# Patient Record
Sex: FEMALE | Race: WHITE | Employment: FULL TIME
[De-identification: names, ages, dates, MRNs, and addresses within clinical notes are randomized per-mention and may not be internally consistent; named-entity substitution may affect disease eponyms.]

---

## 2024-08-24 ENCOUNTER — APPOINTMENT (OUTPATIENT)
Facility: HOSPITAL | Age: 49
End: 2024-08-24
Payer: COMMERCIAL

## 2024-08-24 ENCOUNTER — HOSPITAL ENCOUNTER (INPATIENT)
Facility: HOSPITAL | Age: 49
LOS: 6 days | Discharge: HOME | End: 2024-08-30
Admitting: HOSPITALIST
Payer: COMMERCIAL

## 2024-08-24 DIAGNOSIS — I63.412 CEREBROVASCULAR ACCIDENT (CVA) DUE TO EMBOLISM OF LEFT MIDDLE CEREBRAL ARTERY (HCC): ICD-10-CM

## 2024-08-24 DIAGNOSIS — I82.4Y9 ACUTE DEEP VEIN THROMBOSIS (DVT) OF PROXIMAL VEIN OF LOWER EXTREMITY, UNSPECIFIED LATERALITY (HCC): ICD-10-CM

## 2024-08-24 DIAGNOSIS — I24.9 ACUTE CORONARY SYNDROME (HCC): Primary | ICD-10-CM

## 2024-08-24 DIAGNOSIS — I20.0 UNSTABLE ANGINA PECTORIS (HCC): ICD-10-CM

## 2024-08-24 DIAGNOSIS — I63.9 CEREBROVASCULAR ACCIDENT (CVA), UNSPECIFIED MECHANISM (HCC): ICD-10-CM

## 2024-08-24 DIAGNOSIS — I63.89 CEREBROVASCULAR ACCIDENT (CVA) DUE TO OTHER MECHANISM (HCC): ICD-10-CM

## 2024-08-24 LAB
ALBUMIN SERPL-MCNC: 3.9 G/DL (ref 3.5–5)
ALBUMIN SERPL-MCNC: 3.9 G/DL (ref 3.5–5)
ALBUMIN/GLOB SERPL: 1 (ref 1.1–2.2)
ALBUMIN/GLOB SERPL: 1 (ref 1.1–2.2)
ALP SERPL-CCNC: 67 U/L (ref 45–117)
ALP SERPL-CCNC: 67 U/L (ref 45–117)
ALT SERPL-CCNC: 22 U/L (ref 12–78)
ALT SERPL-CCNC: 22 U/L (ref 12–78)
ANION GAP SERPL CALC-SCNC: 6 MMOL/L (ref 5–15)
ANION GAP SERPL CALC-SCNC: 6 MMOL/L (ref 5–15)
APTT PPP: 23.9 SEC (ref 22.1–31)
APTT PPP: 23.9 SEC (ref 22.1–31)
AST SERPL-CCNC: 44 U/L (ref 15–37)
AST SERPL-CCNC: 44 U/L (ref 15–37)
BASOPHILS # BLD: 0 K/UL (ref 0–0.1)
BASOPHILS # BLD: 0 K/UL (ref 0–0.1)
BASOPHILS # BLD: ABNORMAL K/UL
BASOPHILS # BLD: ABNORMAL K/UL
BASOPHILS NFR BLD: 0 % (ref 0–1)
BASOPHILS NFR BLD: 0 % (ref 0–1)
BASOPHILS NFR BLD: ABNORMAL %
BASOPHILS NFR BLD: ABNORMAL %
BILIRUB SERPL-MCNC: 0.5 MG/DL (ref 0.2–1)
BILIRUB SERPL-MCNC: 0.5 MG/DL (ref 0.2–1)
BUN SERPL-MCNC: 14 MG/DL (ref 6–20)
BUN SERPL-MCNC: 14 MG/DL (ref 6–20)
BUN/CREAT SERPL: 16 (ref 12–20)
BUN/CREAT SERPL: 16 (ref 12–20)
CALCIUM SERPL-MCNC: 8.6 MG/DL (ref 8.5–10.1)
CALCIUM SERPL-MCNC: 8.6 MG/DL (ref 8.5–10.1)
CHLORIDE SERPL-SCNC: 107 MMOL/L (ref 97–108)
CHLORIDE SERPL-SCNC: 107 MMOL/L (ref 97–108)
CO2 SERPL-SCNC: 23 MMOL/L (ref 21–32)
CO2 SERPL-SCNC: 23 MMOL/L (ref 21–32)
CREAT SERPL-MCNC: 0.86 MG/DL (ref 0.55–1.02)
CREAT SERPL-MCNC: 0.86 MG/DL (ref 0.55–1.02)
DIFFERENTIAL METHOD BLD: ABNORMAL
EOSINOPHIL # BLD: 0.1 K/UL (ref 0–0.4)
EOSINOPHIL # BLD: 0.1 K/UL (ref 0–0.4)
EOSINOPHIL # BLD: ABNORMAL K/UL
EOSINOPHIL # BLD: ABNORMAL K/UL
EOSINOPHIL NFR BLD: 1 % (ref 0–7)
EOSINOPHIL NFR BLD: 1 % (ref 0–7)
EOSINOPHIL NFR BLD: ABNORMAL %
EOSINOPHIL NFR BLD: ABNORMAL %
ERYTHROCYTE [DISTWIDTH] IN BLOOD BY AUTOMATED COUNT: 19.9 % (ref 11.5–14.5)
FIBRINOGEN PPP-MCNC: 377 MG/DL (ref 200–475)
FIBRINOGEN PPP-MCNC: 377 MG/DL (ref 200–475)
GLOBULIN SER CALC-MCNC: 3.9 G/DL (ref 2–4)
GLOBULIN SER CALC-MCNC: 3.9 G/DL (ref 2–4)
GLUCOSE BLD STRIP.AUTO-MCNC: 112 MG/DL (ref 65–117)
GLUCOSE BLD STRIP.AUTO-MCNC: 112 MG/DL (ref 65–117)
GLUCOSE SERPL-MCNC: 123 MG/DL (ref 65–100)
GLUCOSE SERPL-MCNC: 123 MG/DL (ref 65–100)
HCG SERPL-ACNC: 3 MIU/ML (ref 0–6)
HCG SERPL-ACNC: 3 MIU/ML (ref 0–6)
HCT VFR BLD AUTO: 20.8 % (ref 35–47)
HCT VFR BLD AUTO: 20.8 % (ref 35–47)
HCT VFR BLD AUTO: 21.9 % (ref 35–47)
HCT VFR BLD AUTO: 21.9 % (ref 35–47)
HCT VFR BLD AUTO: 23.2 % (ref 35–47)
HCT VFR BLD AUTO: 23.2 % (ref 35–47)
HGB BLD-MCNC: 5.3 G/DL (ref 11.5–16)
HGB BLD-MCNC: 5.3 G/DL (ref 11.5–16)
HGB BLD-MCNC: 5.6 G/DL (ref 11.5–16)
HGB BLD-MCNC: 5.6 G/DL (ref 11.5–16)
HGB BLD-MCNC: 5.9 G/DL (ref 11.5–16)
HGB BLD-MCNC: 5.9 G/DL (ref 11.5–16)
IMM GRANULOCYTES # BLD AUTO: 0.1 K/UL (ref 0–0.04)
IMM GRANULOCYTES # BLD AUTO: 0.1 K/UL (ref 0–0.04)
IMM GRANULOCYTES # BLD AUTO: ABNORMAL K/UL
IMM GRANULOCYTES # BLD AUTO: ABNORMAL K/UL
IMM GRANULOCYTES NFR BLD AUTO: 1 % (ref 0–0.5)
IMM GRANULOCYTES NFR BLD AUTO: 1 % (ref 0–0.5)
IMM GRANULOCYTES NFR BLD AUTO: ABNORMAL %
IMM GRANULOCYTES NFR BLD AUTO: ABNORMAL %
INR PPP: 1 (ref 0.9–1.1)
LACTATE SERPL-SCNC: 1 MMOL/L (ref 0.4–2)
LACTATE SERPL-SCNC: 1 MMOL/L (ref 0.4–2)
LYMPHOCYTES # BLD: 0.9 K/UL (ref 0.8–3.5)
LYMPHOCYTES # BLD: 0.9 K/UL (ref 0.8–3.5)
LYMPHOCYTES # BLD: ABNORMAL K/UL
LYMPHOCYTES # BLD: ABNORMAL K/UL
LYMPHOCYTES NFR BLD: 16 % (ref 12–49)
LYMPHOCYTES NFR BLD: 16 % (ref 12–49)
LYMPHOCYTES NFR BLD: ABNORMAL %
LYMPHOCYTES NFR BLD: ABNORMAL %
MAGNESIUM SERPL-MCNC: 2.1 MG/DL (ref 1.6–2.4)
MAGNESIUM SERPL-MCNC: 2.1 MG/DL (ref 1.6–2.4)
MCH RBC QN AUTO: 16 PG (ref 26–34)
MCH RBC QN AUTO: 16 PG (ref 26–34)
MCH RBC QN AUTO: 16.2 PG (ref 26–34)
MCH RBC QN AUTO: 16.2 PG (ref 26–34)
MCH RBC QN AUTO: 16.3 PG (ref 26–34)
MCH RBC QN AUTO: 16.3 PG (ref 26–34)
MCHC RBC AUTO-ENTMCNC: 25.4 G/DL (ref 30–36.5)
MCHC RBC AUTO-ENTMCNC: 25.4 G/DL (ref 30–36.5)
MCHC RBC AUTO-ENTMCNC: 25.5 G/DL (ref 30–36.5)
MCHC RBC AUTO-ENTMCNC: 25.5 G/DL (ref 30–36.5)
MCHC RBC AUTO-ENTMCNC: 25.6 G/DL (ref 30–36.5)
MCHC RBC AUTO-ENTMCNC: 25.6 G/DL (ref 30–36.5)
MCV RBC AUTO: 62.8 FL (ref 80–99)
MCV RBC AUTO: 62.8 FL (ref 80–99)
MCV RBC AUTO: 63.4 FL (ref 80–99)
MCV RBC AUTO: 63.4 FL (ref 80–99)
MCV RBC AUTO: 63.9 FL (ref 80–99)
MCV RBC AUTO: 63.9 FL (ref 80–99)
MONOCYTES # BLD: 0.4 K/UL (ref 0–1)
MONOCYTES # BLD: 0.4 K/UL (ref 0–1)
MONOCYTES # BLD: ABNORMAL K/UL
MONOCYTES # BLD: ABNORMAL K/UL
MONOCYTES NFR BLD: 7 % (ref 5–13)
MONOCYTES NFR BLD: 7 % (ref 5–13)
MONOCYTES NFR BLD: ABNORMAL %
MONOCYTES NFR BLD: ABNORMAL %
NEUTS SEG # BLD: 3.9 K/UL (ref 1.8–8)
NEUTS SEG # BLD: 3.9 K/UL (ref 1.8–8)
NEUTS SEG # BLD: ABNORMAL K/UL
NEUTS SEG # BLD: ABNORMAL K/UL
NEUTS SEG NFR BLD: 75 % (ref 32–75)
NEUTS SEG NFR BLD: 75 % (ref 32–75)
NEUTS SEG NFR BLD: ABNORMAL %
NEUTS SEG NFR BLD: ABNORMAL %
NRBC # BLD: 0 K/UL (ref 0–0.01)
NRBC BLD-RTO: 0 PER 100 WBC
PHOSPHATE SERPL-MCNC: 3.4 MG/DL (ref 2.6–4.7)
PHOSPHATE SERPL-MCNC: 3.4 MG/DL (ref 2.6–4.7)
PLATELET # BLD AUTO: 166 K/UL (ref 150–400)
PLATELET # BLD AUTO: 166 K/UL (ref 150–400)
PLATELET # BLD AUTO: 177 K/UL (ref 150–400)
PLATELET # BLD AUTO: 177 K/UL (ref 150–400)
PLATELET # BLD AUTO: 199 K/UL (ref 150–400)
PLATELET # BLD AUTO: 199 K/UL (ref 150–400)
POTASSIUM SERPL-SCNC: 3.4 MMOL/L (ref 3.5–5.1)
POTASSIUM SERPL-SCNC: 3.4 MMOL/L (ref 3.5–5.1)
PROT SERPL-MCNC: 7.8 G/DL (ref 6.4–8.2)
PROT SERPL-MCNC: 7.8 G/DL (ref 6.4–8.2)
PROTHROMBIN TIME: 10.8 SEC (ref 9–11.1)
PROTHROMBIN TIME: 10.8 SEC (ref 9–11.1)
PROTHROMBIN TIME: 10.9 SEC (ref 9–11.1)
PROTHROMBIN TIME: 10.9 SEC (ref 9–11.1)
RBC # BLD AUTO: 3.28 M/UL (ref 3.8–5.2)
RBC # BLD AUTO: 3.28 M/UL (ref 3.8–5.2)
RBC # BLD AUTO: 3.49 M/UL (ref 3.8–5.2)
RBC # BLD AUTO: 3.49 M/UL (ref 3.8–5.2)
RBC # BLD AUTO: 3.63 M/UL (ref 3.8–5.2)
RBC # BLD AUTO: 3.63 M/UL (ref 3.8–5.2)
RBC MORPH BLD: ABNORMAL
SERVICE CMNT-IMP: NORMAL
SERVICE CMNT-IMP: NORMAL
SODIUM SERPL-SCNC: 136 MMOL/L (ref 136–145)
SODIUM SERPL-SCNC: 136 MMOL/L (ref 136–145)
THERAPEUTIC RANGE: NORMAL SECS (ref 58–77)
THERAPEUTIC RANGE: NORMAL SECS (ref 58–77)
TROPONIN I SERPL HS-MCNC: 5703 NG/L (ref 0–51)
TROPONIN I SERPL HS-MCNC: 5703 NG/L (ref 0–51)
TSH SERPL DL<=0.05 MIU/L-ACNC: 1.74 UIU/ML (ref 0.36–3.74)
TSH SERPL DL<=0.05 MIU/L-ACNC: 1.74 UIU/ML (ref 0.36–3.74)
WBC # BLD AUTO: 5.3 K/UL (ref 3.6–11)
WBC # BLD AUTO: 5.3 K/UL (ref 3.6–11)
WBC # BLD AUTO: 5.4 K/UL (ref 3.6–11)
WBC # BLD AUTO: 5.4 K/UL (ref 3.6–11)
WBC # BLD AUTO: 5.5 K/UL (ref 3.6–11)
WBC # BLD AUTO: 5.5 K/UL (ref 3.6–11)

## 2024-08-24 PROCEDURE — 84100 ASSAY OF PHOSPHORUS: CPT

## 2024-08-24 PROCEDURE — 85025 COMPLETE CBC W/AUTO DIFF WBC: CPT

## 2024-08-24 PROCEDURE — 2580000003 HC RX 258

## 2024-08-24 PROCEDURE — 83540 ASSAY OF IRON: CPT

## 2024-08-24 PROCEDURE — 6360000004 HC RX CONTRAST MEDICATION: Performed by: RADIOLOGY

## 2024-08-24 PROCEDURE — 0042T CT BRAIN PERFUSION: CPT

## 2024-08-24 PROCEDURE — 84443 ASSAY THYROID STIM HORMONE: CPT

## 2024-08-24 PROCEDURE — 85384 FIBRINOGEN ACTIVITY: CPT

## 2024-08-24 PROCEDURE — 86901 BLOOD TYPING SEROLOGIC RH(D): CPT

## 2024-08-24 PROCEDURE — 83605 ASSAY OF LACTIC ACID: CPT

## 2024-08-24 PROCEDURE — 4A03X5D MEASUREMENT OF ARTERIAL FLOW, INTRACRANIAL, EXTERNAL APPROACH: ICD-10-PCS | Performed by: INTERNAL MEDICINE

## 2024-08-24 PROCEDURE — 36415 COLL VENOUS BLD VENIPUNCTURE: CPT

## 2024-08-24 PROCEDURE — 82962 GLUCOSE BLOOD TEST: CPT

## 2024-08-24 PROCEDURE — 84484 ASSAY OF TROPONIN QUANT: CPT

## 2024-08-24 PROCEDURE — 6360000002 HC RX W HCPCS

## 2024-08-24 PROCEDURE — 6370000000 HC RX 637 (ALT 250 FOR IP): Performed by: NURSE PRACTITIONER

## 2024-08-24 PROCEDURE — 84702 CHORIONIC GONADOTROPIN TEST: CPT

## 2024-08-24 PROCEDURE — 83735 ASSAY OF MAGNESIUM: CPT

## 2024-08-24 PROCEDURE — 93005 ELECTROCARDIOGRAM TRACING: CPT | Performed by: HOSPITALIST

## 2024-08-24 PROCEDURE — 85610 PROTHROMBIN TIME: CPT

## 2024-08-24 PROCEDURE — 86870 RBC ANTIBODY IDENTIFICATION: CPT

## 2024-08-24 PROCEDURE — 85730 THROMBOPLASTIN TIME PARTIAL: CPT

## 2024-08-24 PROCEDURE — 2580000003 HC RX 258: Performed by: NURSE PRACTITIONER

## 2024-08-24 PROCEDURE — 86920 COMPATIBILITY TEST SPIN: CPT

## 2024-08-24 PROCEDURE — 99285 EMERGENCY DEPT VISIT HI MDM: CPT

## 2024-08-24 PROCEDURE — 83880 ASSAY OF NATRIURETIC PEPTIDE: CPT

## 2024-08-24 PROCEDURE — 86900 BLOOD TYPING SEROLOGIC ABO: CPT

## 2024-08-24 PROCEDURE — 82728 ASSAY OF FERRITIN: CPT

## 2024-08-24 PROCEDURE — 70450 CT HEAD/BRAIN W/O DYE: CPT

## 2024-08-24 PROCEDURE — 80053 COMPREHEN METABOLIC PANEL: CPT

## 2024-08-24 PROCEDURE — 2000000000 HC ICU R&B

## 2024-08-24 PROCEDURE — 86905 BLOOD TYPING RBC ANTIGENS: CPT

## 2024-08-24 PROCEDURE — 86850 RBC ANTIBODY SCREEN: CPT

## 2024-08-24 PROCEDURE — 83550 IRON BINDING TEST: CPT

## 2024-08-24 PROCEDURE — 3E03317 INTRODUCTION OF OTHER THROMBOLYTIC INTO PERIPHERAL VEIN, PERCUTANEOUS APPROACH: ICD-10-PCS | Performed by: INTERNAL MEDICINE

## 2024-08-24 PROCEDURE — 37195 THROMBOLYTIC THERAPY STROKE: CPT

## 2024-08-24 PROCEDURE — 86860 RBC ANTIBODY ELUTION: CPT

## 2024-08-24 PROCEDURE — 74174 CTA ABD&PLVS W/CONTRAST: CPT

## 2024-08-24 PROCEDURE — 70498 CT ANGIOGRAPHY NECK: CPT

## 2024-08-24 PROCEDURE — 86880 COOMBS TEST DIRECT: CPT

## 2024-08-24 PROCEDURE — 93005 ELECTROCARDIOGRAM TRACING: CPT

## 2024-08-24 PROCEDURE — 85027 COMPLETE CBC AUTOMATED: CPT

## 2024-08-24 RX ORDER — ONDANSETRON 4 MG/1
4 TABLET, ORALLY DISINTEGRATING ORAL EVERY 8 HOURS PRN
Status: DISCONTINUED | OUTPATIENT
Start: 2024-08-24 | End: 2024-08-24

## 2024-08-24 RX ORDER — SODIUM CHLORIDE 9 MG/ML
INJECTION, SOLUTION INTRAVENOUS PRN
Status: DISCONTINUED | OUTPATIENT
Start: 2024-08-24 | End: 2024-08-30 | Stop reason: HOSPADM

## 2024-08-24 RX ORDER — SODIUM CHLORIDE 9 MG/ML
INJECTION, SOLUTION INTRAVENOUS PRN
Status: DISCONTINUED | OUTPATIENT
Start: 2024-08-24 | End: 2024-08-26 | Stop reason: SDUPTHER

## 2024-08-24 RX ORDER — ATORVASTATIN CALCIUM 40 MG/1
80 TABLET, FILM COATED ORAL NIGHTLY
Status: DISCONTINUED | OUTPATIENT
Start: 2024-08-24 | End: 2024-08-30 | Stop reason: HOSPADM

## 2024-08-24 RX ORDER — SODIUM CHLORIDE 0.9 % (FLUSH) 0.9 %
5-40 SYRINGE (ML) INJECTION EVERY 12 HOURS SCHEDULED
Status: DISCONTINUED | OUTPATIENT
Start: 2024-08-24 | End: 2024-08-26 | Stop reason: SDUPTHER

## 2024-08-24 RX ORDER — SODIUM CHLORIDE 0.9 % (FLUSH) 0.9 %
5-40 SYRINGE (ML) INJECTION PRN
Status: DISCONTINUED | OUTPATIENT
Start: 2024-08-24 | End: 2024-08-30 | Stop reason: HOSPADM

## 2024-08-24 RX ORDER — IOPAMIDOL 755 MG/ML
100 INJECTION, SOLUTION INTRAVASCULAR
Status: COMPLETED | OUTPATIENT
Start: 2024-08-24 | End: 2024-08-24

## 2024-08-24 RX ORDER — ASPIRIN 81 MG/1
81 TABLET, CHEWABLE ORAL DAILY
Status: DISCONTINUED | OUTPATIENT
Start: 2024-08-25 | End: 2024-08-30 | Stop reason: HOSPADM

## 2024-08-24 RX ORDER — POLYETHYLENE GLYCOL 3350 17 G/17G
17 POWDER, FOR SOLUTION ORAL DAILY PRN
Status: DISCONTINUED | OUTPATIENT
Start: 2024-08-24 | End: 2024-08-24

## 2024-08-24 RX ORDER — POTASSIUM CHLORIDE 29.8 MG/ML
20 INJECTION INTRAVENOUS PRN
Status: DISCONTINUED | OUTPATIENT
Start: 2024-08-24 | End: 2024-08-30 | Stop reason: HOSPADM

## 2024-08-24 RX ORDER — ONDANSETRON 4 MG/1
4 TABLET, ORALLY DISINTEGRATING ORAL EVERY 8 HOURS PRN
Status: DISCONTINUED | OUTPATIENT
Start: 2024-08-24 | End: 2024-08-30 | Stop reason: HOSPADM

## 2024-08-24 RX ORDER — POLYETHYLENE GLYCOL 3350 17 G/17G
17 POWDER, FOR SOLUTION ORAL DAILY PRN
Status: DISCONTINUED | OUTPATIENT
Start: 2024-08-24 | End: 2024-08-30 | Stop reason: HOSPADM

## 2024-08-24 RX ORDER — SODIUM CHLORIDE 0.9 % (FLUSH) 0.9 %
5-40 SYRINGE (ML) INJECTION PRN
Status: DISCONTINUED | OUTPATIENT
Start: 2024-08-24 | End: 2024-08-26 | Stop reason: SDUPTHER

## 2024-08-24 RX ORDER — ONDANSETRON 2 MG/ML
4 INJECTION INTRAMUSCULAR; INTRAVENOUS EVERY 6 HOURS PRN
Status: DISCONTINUED | OUTPATIENT
Start: 2024-08-24 | End: 2024-08-24

## 2024-08-24 RX ORDER — ACETAMINOPHEN 650 MG/1
650 SUPPOSITORY RECTAL EVERY 6 HOURS PRN
Status: DISCONTINUED | OUTPATIENT
Start: 2024-08-24 | End: 2024-08-30 | Stop reason: HOSPADM

## 2024-08-24 RX ORDER — SODIUM CHLORIDE 0.9 % (FLUSH) 0.9 %
10 SYRINGE (ML) INJECTION ONCE
Status: COMPLETED | OUTPATIENT
Start: 2024-08-24 | End: 2024-08-24

## 2024-08-24 RX ORDER — POTASSIUM CHLORIDE 7.45 MG/ML
10 INJECTION INTRAVENOUS PRN
Status: DISCONTINUED | OUTPATIENT
Start: 2024-08-24 | End: 2024-08-30 | Stop reason: HOSPADM

## 2024-08-24 RX ORDER — ACETAMINOPHEN 325 MG/1
650 TABLET ORAL EVERY 6 HOURS PRN
Status: DISCONTINUED | OUTPATIENT
Start: 2024-08-24 | End: 2024-08-30 | Stop reason: HOSPADM

## 2024-08-24 RX ORDER — ONDANSETRON 2 MG/ML
4 INJECTION INTRAMUSCULAR; INTRAVENOUS EVERY 6 HOURS PRN
Status: DISCONTINUED | OUTPATIENT
Start: 2024-08-24 | End: 2024-08-30 | Stop reason: HOSPADM

## 2024-08-24 RX ORDER — SODIUM CHLORIDE 0.9 % (FLUSH) 0.9 %
5-40 SYRINGE (ML) INJECTION EVERY 12 HOURS SCHEDULED
Status: DISCONTINUED | OUTPATIENT
Start: 2024-08-24 | End: 2024-08-30 | Stop reason: HOSPADM

## 2024-08-24 RX ORDER — MAGNESIUM SULFATE IN WATER 40 MG/ML
2000 INJECTION, SOLUTION INTRAVENOUS PRN
Status: DISCONTINUED | OUTPATIENT
Start: 2024-08-24 | End: 2024-08-30 | Stop reason: HOSPADM

## 2024-08-24 RX ORDER — ASPIRIN 300 MG/1
300 SUPPOSITORY RECTAL DAILY
Status: DISCONTINUED | OUTPATIENT
Start: 2024-08-25 | End: 2024-08-30 | Stop reason: HOSPADM

## 2024-08-24 RX ADMIN — SODIUM CHLORIDE, PRESERVATIVE FREE 10 ML: 5 INJECTION INTRAVENOUS at 23:29

## 2024-08-24 RX ADMIN — SODIUM CHLORIDE, PRESERVATIVE FREE 10 ML: 5 INJECTION INTRAVENOUS at 23:28

## 2024-08-24 RX ADMIN — IOPAMIDOL 100 ML: 755 INJECTION, SOLUTION INTRAVENOUS at 20:56

## 2024-08-24 RX ADMIN — IOPAMIDOL 100 ML: 755 INJECTION, SOLUTION INTRAVENOUS at 21:33

## 2024-08-24 RX ADMIN — Medication 14 MG: at 20:56

## 2024-08-24 RX ADMIN — ATORVASTATIN CALCIUM 80 MG: 40 TABLET, FILM COATED ORAL at 23:41

## 2024-08-24 ASSESSMENT — PAIN - FUNCTIONAL ASSESSMENT
PAIN_FUNCTIONAL_ASSESSMENT: NONE - DENIES PAIN
PAIN_FUNCTIONAL_ASSESSMENT: NONE - DENIES PAIN

## 2024-08-24 ASSESSMENT — PAIN SCALES - GENERAL: PAINLEVEL_OUTOF10: 0

## 2024-08-25 ENCOUNTER — APPOINTMENT (OUTPATIENT)
Facility: HOSPITAL | Age: 49
End: 2024-08-25
Payer: COMMERCIAL

## 2024-08-25 ENCOUNTER — APPOINTMENT (OUTPATIENT)
Facility: HOSPITAL | Age: 49
End: 2024-08-25
Attending: INTERNAL MEDICINE
Payer: COMMERCIAL

## 2024-08-25 LAB
ANION GAP SERPL CALC-SCNC: 5 MMOL/L (ref 5–15)
ANION GAP SERPL CALC-SCNC: 5 MMOL/L (ref 5–15)
BUN SERPL-MCNC: 9 MG/DL (ref 6–20)
BUN SERPL-MCNC: 9 MG/DL (ref 6–20)
BUN/CREAT SERPL: 13 (ref 12–20)
BUN/CREAT SERPL: 13 (ref 12–20)
CALCIUM SERPL-MCNC: 8.8 MG/DL (ref 8.5–10.1)
CALCIUM SERPL-MCNC: 8.8 MG/DL (ref 8.5–10.1)
CHLORIDE SERPL-SCNC: 109 MMOL/L (ref 97–108)
CHLORIDE SERPL-SCNC: 109 MMOL/L (ref 97–108)
CHOLEST SERPL-MCNC: 83 MG/DL
CHOLEST SERPL-MCNC: 83 MG/DL
CO2 SERPL-SCNC: 24 MMOL/L (ref 21–32)
CO2 SERPL-SCNC: 24 MMOL/L (ref 21–32)
CREAT SERPL-MCNC: 0.69 MG/DL (ref 0.55–1.02)
CREAT SERPL-MCNC: 0.69 MG/DL (ref 0.55–1.02)
ERYTHROCYTE [DISTWIDTH] IN BLOOD BY AUTOMATED COUNT: 19.9 % (ref 11.5–14.5)
ERYTHROCYTE [DISTWIDTH] IN BLOOD BY AUTOMATED COUNT: 19.9 % (ref 11.5–14.5)
EST. AVERAGE GLUCOSE BLD GHB EST-MCNC: ABNORMAL MG/DL
EST. AVERAGE GLUCOSE BLD GHB EST-MCNC: ABNORMAL MG/DL
FERRITIN SERPL-MCNC: 2 NG/ML (ref 8–252)
FERRITIN SERPL-MCNC: 2 NG/ML (ref 8–252)
GLUCOSE SERPL-MCNC: 115 MG/DL (ref 65–100)
GLUCOSE SERPL-MCNC: 115 MG/DL (ref 65–100)
HBA1C MFR BLD: <3.8 % (ref 4–5.6)
HBA1C MFR BLD: <3.8 % (ref 4–5.6)
HCT VFR BLD AUTO: 22.1 % (ref 35–47)
HCT VFR BLD AUTO: 22.1 % (ref 35–47)
HDLC SERPL-MCNC: 30 MG/DL
HDLC SERPL-MCNC: 30 MG/DL
HDLC SERPL: 2.8 (ref 0–5)
HDLC SERPL: 2.8 (ref 0–5)
HGB BLD-MCNC: 5.6 G/DL (ref 11.5–16)
HGB BLD-MCNC: 5.6 G/DL (ref 11.5–16)
HISTORY CHECK: NORMAL
HISTORY CHECK: NORMAL
IRON SATN MFR SERPL: 3 % (ref 20–50)
IRON SATN MFR SERPL: 3 % (ref 20–50)
IRON SERPL-MCNC: 11 UG/DL (ref 35–150)
IRON SERPL-MCNC: 11 UG/DL (ref 35–150)
LDLC SERPL CALC-MCNC: 34.2 MG/DL (ref 0–100)
LDLC SERPL CALC-MCNC: 34.2 MG/DL (ref 0–100)
MAGNESIUM SERPL-MCNC: 2.1 MG/DL (ref 1.6–2.4)
MAGNESIUM SERPL-MCNC: 2.1 MG/DL (ref 1.6–2.4)
MCH RBC QN AUTO: 16.2 PG (ref 26–34)
MCH RBC QN AUTO: 16.2 PG (ref 26–34)
MCHC RBC AUTO-ENTMCNC: 25.3 G/DL (ref 30–36.5)
MCHC RBC AUTO-ENTMCNC: 25.3 G/DL (ref 30–36.5)
MCV RBC AUTO: 63.9 FL (ref 80–99)
MCV RBC AUTO: 63.9 FL (ref 80–99)
NRBC # BLD: 0 K/UL (ref 0–0.01)
NRBC # BLD: 0 K/UL (ref 0–0.01)
NRBC BLD-RTO: 0 PER 100 WBC
NRBC BLD-RTO: 0 PER 100 WBC
NT PRO BNP: 2613 PG/ML
NT PRO BNP: 2613 PG/ML
PHOSPHATE SERPL-MCNC: 3.9 MG/DL (ref 2.6–4.7)
PHOSPHATE SERPL-MCNC: 3.9 MG/DL (ref 2.6–4.7)
PLATELET # BLD AUTO: 190 K/UL (ref 150–400)
PLATELET # BLD AUTO: 190 K/UL (ref 150–400)
POTASSIUM SERPL-SCNC: 3.6 MMOL/L (ref 3.5–5.1)
POTASSIUM SERPL-SCNC: 3.6 MMOL/L (ref 3.5–5.1)
RBC # BLD AUTO: 3.46 M/UL (ref 3.8–5.2)
RBC # BLD AUTO: 3.46 M/UL (ref 3.8–5.2)
SODIUM SERPL-SCNC: 138 MMOL/L (ref 136–145)
SODIUM SERPL-SCNC: 138 MMOL/L (ref 136–145)
TIBC SERPL-MCNC: 365 UG/DL (ref 250–450)
TIBC SERPL-MCNC: 365 UG/DL (ref 250–450)
TRIGL SERPL-MCNC: 94 MG/DL
TRIGL SERPL-MCNC: 94 MG/DL
TROPONIN I SERPL HS-MCNC: 5336 NG/L (ref 0–51)
TROPONIN I SERPL HS-MCNC: 5336 NG/L (ref 0–51)
VLDLC SERPL CALC-MCNC: 18.8 MG/DL
VLDLC SERPL CALC-MCNC: 18.8 MG/DL
WBC # BLD AUTO: 4.9 K/UL (ref 3.6–11)
WBC # BLD AUTO: 4.9 K/UL (ref 3.6–11)

## 2024-08-25 PROCEDURE — 6360000002 HC RX W HCPCS: Performed by: INTERNAL MEDICINE

## 2024-08-25 PROCEDURE — 2580000003 HC RX 258: Performed by: INTERNAL MEDICINE

## 2024-08-25 PROCEDURE — 85027 COMPLETE CBC AUTOMATED: CPT

## 2024-08-25 PROCEDURE — 70450 CT HEAD/BRAIN W/O DYE: CPT

## 2024-08-25 PROCEDURE — 2580000003 HC RX 258

## 2024-08-25 PROCEDURE — 2580000003 HC RX 258: Performed by: NURSE PRACTITIONER

## 2024-08-25 PROCEDURE — 93306 TTE W/DOPPLER COMPLETE: CPT

## 2024-08-25 PROCEDURE — 80061 LIPID PANEL: CPT

## 2024-08-25 PROCEDURE — 6370000000 HC RX 637 (ALT 250 FOR IP): Performed by: NURSE PRACTITIONER

## 2024-08-25 PROCEDURE — 86902 BLOOD TYPE ANTIGEN DONOR EA: CPT

## 2024-08-25 PROCEDURE — 86921 COMPATIBILITY TEST INCUBATE: CPT

## 2024-08-25 PROCEDURE — 84100 ASSAY OF PHOSPHORUS: CPT

## 2024-08-25 PROCEDURE — 70551 MRI BRAIN STEM W/O DYE: CPT

## 2024-08-25 PROCEDURE — 80048 BASIC METABOLIC PNL TOTAL CA: CPT

## 2024-08-25 PROCEDURE — P9016 RBC LEUKOCYTES REDUCED: HCPCS

## 2024-08-25 PROCEDURE — 2000000000 HC ICU R&B

## 2024-08-25 PROCEDURE — 99291 CRITICAL CARE FIRST HOUR: CPT | Performed by: INTERNAL MEDICINE

## 2024-08-25 PROCEDURE — 86922 COMPATIBILITY TEST ANTIGLOB: CPT

## 2024-08-25 PROCEDURE — 36430 TRANSFUSION BLD/BLD COMPNT: CPT

## 2024-08-25 PROCEDURE — 30233N1 TRANSFUSION OF NONAUTOLOGOUS RED BLOOD CELLS INTO PERIPHERAL VEIN, PERCUTANEOUS APPROACH: ICD-10-PCS | Performed by: INTERNAL MEDICINE

## 2024-08-25 PROCEDURE — 83036 HEMOGLOBIN GLYCOSYLATED A1C: CPT

## 2024-08-25 PROCEDURE — 83735 ASSAY OF MAGNESIUM: CPT

## 2024-08-25 PROCEDURE — 36415 COLL VENOUS BLD VENIPUNCTURE: CPT

## 2024-08-25 RX ORDER — LIDOCAINE 4 G/G
2 PATCH TOPICAL DAILY
Status: DISCONTINUED | OUTPATIENT
Start: 2024-08-25 | End: 2024-08-30 | Stop reason: HOSPADM

## 2024-08-25 RX ADMIN — ATORVASTATIN CALCIUM 80 MG: 40 TABLET, FILM COATED ORAL at 19:57

## 2024-08-25 RX ADMIN — SODIUM CHLORIDE, PRESERVATIVE FREE 10 ML: 5 INJECTION INTRAVENOUS at 09:20

## 2024-08-25 RX ADMIN — SODIUM CHLORIDE, PRESERVATIVE FREE 10 ML: 5 INJECTION INTRAVENOUS at 20:58

## 2024-08-25 RX ADMIN — ASPIRIN 81 MG: 81 TABLET, CHEWABLE ORAL at 19:57

## 2024-08-25 RX ADMIN — SODIUM CHLORIDE 300 MG: 9 INJECTION, SOLUTION INTRAVENOUS at 10:33

## 2024-08-25 RX ADMIN — SODIUM CHLORIDE, PRESERVATIVE FREE 10 ML: 5 INJECTION INTRAVENOUS at 20:59

## 2024-08-25 RX ADMIN — ACETAMINOPHEN 650 MG: 325 TABLET ORAL at 19:57

## 2024-08-25 RX ADMIN — ACETAMINOPHEN 650 MG: 325 TABLET ORAL at 09:20

## 2024-08-25 ASSESSMENT — PAIN DESCRIPTION - DESCRIPTORS
DESCRIPTORS: ACHING
DESCRIPTORS: ACHING

## 2024-08-25 ASSESSMENT — PAIN SCALES - GENERAL
PAINLEVEL_OUTOF10: 3
PAINLEVEL_OUTOF10: 0
PAINLEVEL_OUTOF10: 3
PAINLEVEL_OUTOF10: 0
PAINLEVEL_OUTOF10: 0
PAINLEVEL_OUTOF10: 5
PAINLEVEL_OUTOF10: 3

## 2024-08-25 ASSESSMENT — PAIN DESCRIPTION - LOCATION
LOCATION: HEAD
LOCATION: HEAD
LOCATION: BACK
LOCATION: BACK

## 2024-08-25 ASSESSMENT — PAIN DESCRIPTION - ORIENTATION
ORIENTATION: UPPER;LOWER
ORIENTATION: LOWER

## 2024-08-25 NOTE — PROGRESS NOTES
-Please complete MRI History and Safety Screening Form.    - Patient cannot be scanned until this form is completed and reviewed in MRI to ensure patient is SAFE and eligible for MRI.  - CALL MRI when this has been successfully completed at [phone].

## 2024-08-25 NOTE — ED TRIAGE NOTES
Ambulatory into triage with  who reports sudden onset expressive aphasia; LNW 45 mins pta. MD Stone to triage for stroke rule out. Level 1 Stroke called overhead and patient taken to CT1.

## 2024-08-25 NOTE — ED NOTES
Pt to CT1 with Mignon RAYA, Aletha RAYA, Joana Gold, and MD Stone. IV access obtained. Pt continues to have difficulty expressing herself.

## 2024-08-25 NOTE — PROGRESS NOTES
Bedside and Verbal shift change report given to Caridad and Carlyn (oncoming nurse) by Girma (offgoing nurse). Report included the following information Nurse Handoff Report, MAR, Recent Results, and Cardiac Rhythm sinus tachycardia .  Primary nursing care is to be provided by Carlyn RN with Caridad RN as orientation preceptor.    Patient is alert; no acute distress.  at bedside.  Expressive aphasia on neuro exam.      Stat CT obtained as patient complains of headache  0845- Called and verified with MRI that the screening form has been received.  They will contact the unit when a time-slot is available to take the patient.    9:17 AM Dr. Shah (Neuro) at bedside    10:32 AM Next available MRI around 1130/1200.  We will plan to call before we take the patient down    10:47 AM Cards consult called to [facility] (Dr. Lackey)    11:23 AM per cardiology NP, Dr. Marc is to read the echo    1145- Called and made OB/GYN hospitalist on call aware of the consult.      1147- clarified with blood bank that this patient has not had a blood transfusion in the past 3 months    5:04 PM contacted [facility] blood bank for a status update on patient's blood.  The [facility] is actively working on getting the blood products to us, but were unable to give a timeframe of arrival.  Our blood bank will call the unit when the blood is available for pickup

## 2024-08-25 NOTE — H&P
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  She was given TNK in the ED after head CT was unrevealing.  She did not have other neurological deficits.     Lab review showed H/H 5.9/23.2 with repeat 5.3/20.8.  VS showed tachycardia to 120s-130s with SBP 140s-160s.  She had sats >95% on RA and RR 20. She was afebrile.  She and  denied bleeding foci, including hematemesis, hemoptysis, vaginal bleeding, dark stools, etc.  She reported regular menstrual periods and noted that they are heavy, but are of the same volume and frequency that they have been for years. She last had her H/H checked about 15y ago when her son was born.     CT head and neck showed L M3 segment occlusion with correlating perfusion defect.  I spoke with Dr Cortés, on call for NIS.  No intervention indicated from NIS standpoint.       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      NOTE OF PERSONAL INVOLVEMENT IN CARE   This patient has a high probability of imminent, clinically significant deterioration, which requires the highest level of preparedness to intervene urgently. I participated in the decision-making and personally managed or directed the management of the following life and organ supporting interventions that required my frequent assessment to treat or prevent imminent deterioration.    I personally spent 30 minutes of critical care time.  This is time spent at this critically ill patient's bedside actively involved in patient care as well as the coordination of care.  This does not include any procedural time which has been billed separately.    Mack Hollins MD   Critical Care Medicine  [facility]

## 2024-08-25 NOTE — CONSULTS
Date of Consultation:  August 25, 2024    Referring Physician: MD Anand     Reason for Consultation:  aphasia     Chief Complaint   Patient presents with    Aphasia     Difficulty finding words and following commands due to processing words used.        History of Present Illness:   Isabella Gomez is a 48 y.o. female with no significant past medical history presenting with acute onset of word finding difficulties, found to have a distal left M3 occlusion, received thrombolytic therapy, transferred to the ICU.    Patient is aphasic and has some difficulty providing history.  She states that she had no slurred speech only difficulty with her words.  She is still having persistent aphasia.  She has no other neurological symptoms.    She had a head CT which showed no acute intracranial process, CTA head and neck, CTP revealing for left M3 segment occlusion with delayed perfusion in the left posterior temporal/occipital lobe.  Discussion with neuroIR which did not recommend thrombectomy.     Interval events:  Patient had a headache overnight and had a head CT done which showed evolving left MCA territory infarction.  Patient's hemoglobin has also been as low as 5.3 while here in the hospital, pending blood transfusion.  She was determined to have large fibroids.    She denies any history of blood clots, family history of blood clots, early history of strokes in the family, history of IV drug use, tobacco or alcohol use, denies chronic medical conditions, denies history of miscarriages.  She is not on any estrogen or progesterone, no IUD.      No past medical history on file.     No past surgical history on file.     No family history on file.     Social History     Tobacco Use    Smoking status: Not on file    Smokeless tobacco: Not on file   Substance Use Topics    Alcohol use: Not on file        No Known Allergies     Prior to Admission medications    Not on File       Review of Systems:    General,

## 2024-08-25 NOTE — H&P
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  She was given TNK in the ED after head CT was unrevealing.  She did not have other neurological deficits.     Lab review showed H/H 5.9/23.2 with repeat 5.3/20.8.  VS showed tachycardia to 120s-130s with SBP 140s-160s.  She had sats >95% on RA and RR 20. She was afebrile.  She and  denied bleeding foci, including hematemesis, hemoptysis, vaginal bleeding, dark stools, etc.  She reported regular menstrual periods and noted that they are heavy, but are of the same volume and frequency that they have been for years. She last had her H/H checked about 15y ago when her son was born.     CT head and neck showed L M3 segment occlusion with correlating perfusion defect.  I spoke with Dr Cortés, on call for NIS.  No intervention indicated from NIS standpoint.     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      Palpations: Abdomen is soft.   Musculoskeletal:         General: Normal range of motion.      Cervical back: Normal range of motion.   Skin:     General: Skin is warm and dry.      Capillary Refill: Capillary refill takes less than 2 seconds.   Neurological:      Mental Status: She is alert and oriented to person, place, and time.      Comments: Expressive aphasia; ALLEN to command with equal strength; no focal deficits   Psychiatric:         Mood and Affect: Mood normal.         Labs and Data: Reviewed 24  Medications: Reviewed 24  Imaging: Reviewed 24    BP (!) 158/83   Pulse (!) 128   Temp 98.6 °F (37 °C) (Oral)   Resp 20   Ht 1.702 m (5' 7\")   Wt 57.1 kg (125 lb 14.1 oz)   SpO2 100%   BMI 19.72 kg/m²      Temp (24hrs), Av.2 °F (36.8 °C), Min:98 °F (36.7 °C), Max:98.6 °F (37 °C)           Intake/Output:   No intake or output data in the 24 hours ending 24 6151    Imaging; Reads by radiology    CT head/neck/perfusion  . Left M3 segment occlusion, with correlating perfusion defect     CTAP: 1. Prominent uterine fibroids, including one which appears to extend into the  vaginal canal. These have prominent feeding arteries. Unable to confidently  evaluate for active bleeding due to monophasic exam. No intraabdominal bleeding.      Prominent uterine fibroids, including one which appears to extend into the  vaginal canal. These have prominent feeding arteries. Unable to confidently  evaluate for active bleeding due to monophasic exam. No intraabdominal bleeding.    CRITICAL CARE DOCUMENTATION  I had a face to face encounter with the patient, reviewed and interpreted patient data including clinical events, labs, images, vital signs, I/O's, and examined patient.  I have discussed the case and the plan and management of the patient's care with the consulting services, the bedside nurses and the respiratory therapist.      NOTE OF PERSONAL INVOLVEMENT IN CARE   This  patient has a high probability of imminent, clinically significant deterioration, which requires the highest level of preparedness to intervene urgently. I participated in the decision-making and personally managed or directed the management of the following life and organ supporting interventions that required my frequent assessment to treat or prevent imminent deterioration.    I personally spent 90 minutes of critical care time.  This is time spent at this critically ill patient's bedside actively involved in patient care as well as the coordination of care.  This does not include any procedural time which has been billed separately.    Aleksandra Somers, SHAVON - NP   Critical Care Medicine  [facility]

## 2024-08-25 NOTE — CONSENT
Informed Consent for Blood Component Transfusion Note    I have discussed with the patient and spouse the rationale for blood component transfusion; its benefits in treating or preventing fatigue, organ damage, or death; and its risk which includes mild transfusion reactions, rare risk of blood borne infection, or more serious but rare reactions. I have discussed the alternatives to transfusion, including the risk and consequences of not receiving transfusion. The patient and spouse had an opportunity to ask questions and had agreed to proceed with transfusion of blood components.    Electronically signed by SHAVON Villar NP on 8/25/24 at 2:04 AM EDT

## 2024-08-25 NOTE — ED NOTES
Teleneuro orders to hold TNK at this time with intention to follow up on plan following wet scans.

## 2024-08-25 NOTE — PROGRESS NOTES
Occupational Therapy    OT referral received and chart reviewed. Pt hgb 5.6, awaiting RBC transfusion. OT will defer and follow up post transfusion and when hgb >7.0.

## 2024-08-25 NOTE — PROGRESS NOTES
0715 - Bedside verbal report received from ELVER Rayo by this RN and ELVER Mclean.  This RN orienting under ELVER Mclean.  Documenting and providing care under this chart at the guidance and instruction of ELVER Mclean.  NIH completed at this time with night shift RN.   No changes to previous assessment per night RN.    0735 - Pt purewick changed, pad changed.      0745 - Pt complaining of headache, rating 5/10. Provider notified.    0751 - Orders placed for Stat head CT.    0755 - Pt leaving unit for CT on room air, monitor, no drips running.    0812 - Pt returned to unit.  No changes to previous Neuro assessment.  Pt provided with knit underwear.  Pt pleasant,  at bedside, supportive to patient, active in care.    0900 - Neuro assessment performed.  No changes to previous assessment.    1000 - Neuro assessment performed.  No significant changes to previous assessment.  Pt self-reporting slight improvement in word finding.    1100 - Neuro assessment performed.  No significant changes to previous assessment    1108 - RN collaborating with Dr. Diaz on pt labs.  Per Lab, RN is to collect 11 tubes of blood.  Pt hgb 5.6, and lab unable to find blood for pt antibodies at this time.  Per Dr. Diaz, RN's are okay to wait for unit of blood to be located before drawing lab.    1200 - Neuro assessment performed.  No significant changes to previous assessment    1230 - Pt tolerating clear liquid.  Pt requesting solid food.  Diet advancement discussed with Dr. Gloria per nursing discretion.  Regular diet ordered for pt.    1300 - Neuro assessment performed.  No significant changes to previous assessment    1400 - This RN at lunch.  Per ELVER Upton, neuro assessment completed, no changes to previous assessment    1500 - Neuro assessment completed no changes to previous assessment.  Pt provided with microwave meal, crackers, peanut butter, italian ice and drink of choice.      1545 - Pt tolerated meal well.  Pt being  transferred off unit for MRI.  Pt removed jewlery, provided with clear ziploc bag to place them in.  Jewelry left in husbands possession.   remaining in room for diagnostic.    1608 - Pt back in room, meal tray at bedside.  Pt assisted with pad change and hygiene.    1923 - Report given to ELVER Mott with ELVER Upton.

## 2024-08-25 NOTE — ED PROVIDER NOTES
[facility] 1 NEUROSCIENCE TELEMETRY  EMERGENCY DEPARTMENT ENCOUNTER    Patient Name: Isabella Gomez  MRN: [id number]  YOB: 1975  Provider: Vernon Stone MD  PCP: None, None  Time/Date of evaluation: 9:38 PM EDT on 8/24/24    History of Presenting Illness     Chief Complaint   Patient presents with    Aphasia     Difficulty finding words and following commands due to processing words used.      History Provided by: Patient   History is limited by: Nothing    HISTORY (Narrative):   Isabella Gomez is a 48 y.o. female with no medical history, on no medications presenting to the ER due to concern by family that she has been having difficulty with her words, appeared confused, notes this began 30-40 minutes prior to arrival.   also notes that the patient had chest tightness yesterday which went away on its own.  Denies shortness of breath, diaphoresis, nausea, vomiting, diarrhea, numbness, tingling, runny nose, dizziness, lightheadedness.      Nursing Notes were all reviewed and agreed with or any disagreements were addressed in the HPI.    Past History     PAST MEDICAL HISTORY:  History reviewed. No pertinent past medical history.    PAST SURGICAL HISTORY:  No past surgical history on file.    FAMILY HISTORY:  No family history on file.    SOCIAL HISTORY:  Social History     Tobacco Use    Smoking status: Never    Smokeless tobacco: Never   Substance Use Topics    Alcohol use: Not Currently    Drug use: Not Currently       MEDICATIONS:  No current facility-administered medications on file prior to encounter.     No current outpatient medications on file prior to encounter.       ALLERGIES:  No Known Allergies    SOCIAL DETERMINANTS OF HEALTH:  Social Determinants of Health     Tobacco Use: Low Risk  (8/25/2024)    Patient History     Smoking Tobacco Use: Never     Smokeless Tobacco Use: Never     Passive Exposure: Not on file   Alcohol Use: Not on file   Financial Resource Strain: Not on file  136 20 94 % 1.702 m (5' 7\") 57.1 kg (125 lb 14.1 oz)       ED COURSE/Records Reviewed with summary (prior medical records and Nursing notes)  {Records Reviewed:90063::\"Nursing Notes\"}       SEPSIS:  Is this patient to be included in the SEP-1 core measure? {Sep-1 Core Yes/No:129596}    Clinical Management Tools:  {CMT List:49288::\"Not Applicable\"}    Patient was given the following medications:    Medications   sodium chloride flush 0.9 % injection 5-40 mL (has no administration in time range)   sodium chloride flush 0.9 % injection 5-40 mL (has no administration in time range)   0.9 % sodium chloride infusion (has no administration in time range)   sodium chloride flush 0.9 % injection 10 mL (has no administration in time range)     Followed by   TENECTEplase (TNKASE) injection 14 mg (14 mg IntraVENous Given 8/24/24 2056)     Followed by   sodium chloride flush 0.9 % injection 10 mL (has no administration in time range)   dextrose bolus 10% 125 mL (has no administration in time range)   sodium chloride flush 0.9 % injection 5-40 mL (has no administration in time range)   sodium chloride flush 0.9 % injection 5-40 mL (has no administration in time range)   0.9 % sodium chloride infusion (has no administration in time range)   potassium chloride 20 mEq/50 mL IVPB (Central Line) (has no administration in time range)     Or   potassium chloride 10 mEq/100 mL IVPB (Peripheral Line) (has no administration in time range)   magnesium sulfate 2000 mg in 50 mL IVPB premix (has no administration in time range)   acetaminophen (TYLENOL) tablet 650 mg (has no administration in time range)     Or   acetaminophen (TYLENOL) suppository 650 mg (has no administration in time range)   sodium chloride flush 0.9 % injection 5-40 mL (has no administration in time range)   sodium chloride flush 0.9 % injection 5-40 mL (has no administration in time range)   0.9 % sodium chloride infusion (has no administration in time range)  injection 1.5 mL (1.5 mLs IntraVENous Given 8/28/24 1558)   technetium sestamibi (CARDIOLITE) injection 10.2 millicurie (10.2 millicuries IntraVENous Given 8/29/24 0805)   regadenoson (LEXISCAN) injection 0.4 mg (0.4 mg IntraVENous Given 8/29/24 0933)   technetium sestamibi (CARDIOLITE) injection 32.4 millicurie (32.4 millicuries IntraVENous Given 8/29/24 0805)     CONSULTS:    IP CONSULT TO TELE-NEUROLOGY  IP CONSULT TO NEUROLOGY  IP CONSULT TO NEUROLOGY  IP CONSULT TO OB GYN  IP CONSULT TO CARDIOLOGY  IP CONSULT TO CARDIOLOGY  IP CONSULT TO HEMATOLOGY    Social Determinants affecting Diagnosis/Treatment: None    DISCUSSION:  Isabella Gomez is a 48 y.o.  patient presenting with symptoms concerning for acute CVA versus TIA. Other items on the differential include dissection, AMI, hypoglycemia or other metabolic derangement such as hepatic/uremic encephalopathy, medication side effect, or post-ictal Saulo’s paralysis. However, presentation most concerning for a CVA. EKG without evidence of STEMI or ischemia, fingerstick BS not hypoglycemic, and clinical picture does not suggest other stroke mimic. Plan to workup for acute CVA / TIA.  Plan: Code stroke protocol, CT/CTA Head and Neck, stroke labs, Neurology stroke consult      ADDITIONAL CONSIDERATIONS:  None  Procedures/Critical Care     CRITICAL CARE NOTE :  IMPENDING DETERIORATION -Cardiovascular and CNS  ASSOCIATED RISK FACTORS - CNS Decompensation  MANAGEMENT- Bedside Assessment  INTERPRETATION -  CT Scan  INTERVENTIONS - hemodynamic mngmt and Neurologic interventions   CASE REVIEW - Hospitalist/Intensivist  TREATMENT RESPONSE -Stable  PERFORMED BY - Self   NOTES   :  I personally spent 60 minutes of critical care time with this patient. This is time spent at this critically ill patient's bedside actively involved in patient care as well as the coordination of care and discussions with the patient's family. This includes time involved in ordering and reviewing

## 2024-08-25 NOTE — PROGRESS NOTES
Physical Therapy     PT referral received and chart reviewed. Pt hgb 5.6, awaiting RBC transfusion. PT will defer and follow up post transfusion and when hgb >7.0.    Gianna Perales PT, DPT, NCS

## 2024-08-25 NOTE — CONSULTS
Gyn Consult    Subjective:     Isabella Gomez is a 48 y.o.  admitted secondary to Left MCA infarction.  Also found to be severely anemic (Hbg 5.6).  GYN consulted due to findings of uterine fibroids to include one appearing to be prolapsed into the vagina.  Pt reports menses have historically been heavy (changing tampons q 2-3 hours) but denies large clots or flooding.  Menses are no different now than they have been for years.  Currently having minimal bleeding.  Periods are also painful.  Takes iron on and off for known chronic anemia.  Pt has no future child bearing desires (using vasectomy for BC).    PGYNHX:  Prior pt of [facility] (but much greater than 3 years ago).  Never been told she had uterine fibroids.  No prior female surgery.    POBHX:  5 prior uncomplicated pregnancies resulting in 5 uncomplicated 's    Patient Active Problem List    Diagnosis Date Noted    Acute CVA (cerebrovascular accident) (HCC) 2024     PMHX:  Anemia  PSHX:  None    Social History     Tobacco Use    Smoking status: Never    Smokeless tobacco: Never   Substance Use Topics    Alcohol use: Not Currently      FMHX:  None     No Known Allergies     Review of Systems:    As per HPI and expressive aphasia    Objective:     Patient Vitals for the past 8 hrs:   BP Temp Temp src Pulse Resp SpO2 Height Weight   24 1028 (!) 146/70 -- -- (!) 118 -- -- 1.651 m (5' 5\") 56.7 kg (125 lb)   24 1000 (!) 146/70 -- -- (!) 112 16 -- -- --   24 0900 (!) 143/77 -- -- (!) 112 22 96 % -- --   24 0800 (!) 146/79 -- -- -- -- -- -- --   24 0700 (!) 147/78 98.7 °F (37.1 °C) Oral (!) 113 16 92 % -- --   24 0515 127/72 -- -- (!) 103 19 93 % -- --   24 0500 130/76 -- -- (!) 103 20 94 % -- --   24 0445 135/73 -- -- (!) 106 17 94 % -- --     Temp (24hrs), Av.4 °F (36.9 °C), Min:98 °F (36.7 °C), Max:98.7 °F (37.1 °C)    Physical Exam:   Gen:  Thin  female, NAD, A&O, expressive aphasia  Abd:   16.2 (L) 26.0 - 34.0 PG    MCHC 25.3 (L) 30.0 - 36.5 g/dL    RDW 19.9 (H) 11.5 - 14.5 %    Platelets 190 150 - 400 K/uL    Nucleated RBCs 0.0 0  WBC    nRBC 0.00 0.00 - 0.01 K/uL   Hemoglobin A1c    Collection Time: 08/25/24  4:18 AM   Result Value Ref Range    Hemoglobin A1C <3.8 (L) 4.0 - 5.6 %    Estimated Avg Glucose Cannot be calculated mg/dL   Lipid Panel    Collection Time: 08/25/24  4:18 AM   Result Value Ref Range    Cholesterol, Total 83 <200 MG/DL    Triglycerides 94 <150 MG/DL    HDL 30 MG/DL    LDL Cholesterol 34.2 0 - 100 MG/DL    VLDL Cholesterol Calculated 18.8 MG/DL    Chol/HDL Ratio 2.8 0.0 - 5.0     Echo (TTE) complete (PRN contrast/bubble/strain/3D)    Collection Time: 08/25/24 10:28 AM   Result Value Ref Range    Body Surface Area 1.61 m2       Imaging:   EXAM:  CTA CHEST ABDOMEN PELVIS W CONTRAST     INDICATION: Concern for intra-abdominal bleeding. Dropping hemoglobin after  thrombolytic therapy. Tachycardia.     COMPARISON: None.     TECHNIQUE: Helical thin section CT the chest, abdomen, and pelvis after  uneventful intravenous administration of nonionic contrast 100 mL of isovue 370.  Coronal and sagittal reformats were performed. 3D post processing was performed,  with coronal and sagittal reformats, as well as coronal MIP reconstructions.  CT  dose reduction was achieved through the use of a standardized protocol tailored  for this examination and automatic exposure control for dose modulation.     FINDINGS:      VASCULAR:  Normal aorta, no significant atherosclerotic disease. Visceral vessels are  widely patent. Main pulmonary artery is normal caliber. No pulmonary Vince  filling defects.     Hyperemic uterine fibroids. Hyperdensities within the uterine fibroids most  likely reflect prominent feeding vessels.     NONVASCULAR:     CHEST WALL: No mass or axillary lymphadenopathy.  THYROID: No nodule.  MEDIASTINUM: No mass or lymphadenopathy.  MICHELLE: No mass or  setting of difficult to match serum antibodies.  Soonest patient able to go to the OR for surgical management is 8/28 due to TNKASE on the evening of 8/24  Prior to being an operative candidate, Hgb will need to be at least 7.  Need Neuro and Cardiology surgical clearance prior to 8/28.  Patient will need to be NPO at [facility] on the evening of 8/27.  Appreciate Dr Carbajal's (Anesthesia) review of the patient's chart in preparation for above case on 8/28.  Case discussed with Dr Hernandez who will be [facility] MD on 8/28 as well as Dr Martin who will be [facility] MD on 8/28.  Specific surgical plan pending their patient counseling on 8/28 as well as EUA in OR.    Signed By: Shawna Castro MD     August 25, 2024

## 2024-08-26 ENCOUNTER — APPOINTMENT (OUTPATIENT)
Facility: HOSPITAL | Age: 49
End: 2024-08-26
Payer: COMMERCIAL

## 2024-08-26 LAB
ANION GAP SERPL CALC-SCNC: 8 MMOL/L (ref 5–15)
ANION GAP SERPL CALC-SCNC: 8 MMOL/L (ref 5–15)
APPEARANCE UR: ABNORMAL
APPEARANCE UR: ABNORMAL
BACTERIA URNS QL MICRO: ABNORMAL /HPF
BACTERIA URNS QL MICRO: ABNORMAL /HPF
BILIRUB UR QL: NEGATIVE
BILIRUB UR QL: NEGATIVE
BUN SERPL-MCNC: 10 MG/DL (ref 6–20)
BUN SERPL-MCNC: 10 MG/DL (ref 6–20)
BUN/CREAT SERPL: 14 (ref 12–20)
BUN/CREAT SERPL: 14 (ref 12–20)
CALCIUM SERPL-MCNC: 8.4 MG/DL (ref 8.5–10.1)
CALCIUM SERPL-MCNC: 8.4 MG/DL (ref 8.5–10.1)
CHLORIDE SERPL-SCNC: 106 MMOL/L (ref 97–108)
CHLORIDE SERPL-SCNC: 106 MMOL/L (ref 97–108)
CO2 SERPL-SCNC: 22 MMOL/L (ref 21–32)
CO2 SERPL-SCNC: 22 MMOL/L (ref 21–32)
COLOR UR: ABNORMAL
COLOR UR: ABNORMAL
CREAT SERPL-MCNC: 0.7 MG/DL (ref 0.55–1.02)
CREAT SERPL-MCNC: 0.7 MG/DL (ref 0.55–1.02)
EKG ATRIAL RATE: 121 BPM
EKG ATRIAL RATE: 121 BPM
EKG ATRIAL RATE: 125 BPM
EKG ATRIAL RATE: 125 BPM
EKG ATRIAL RATE: 133 BPM
EKG ATRIAL RATE: 133 BPM
EKG DIAGNOSIS: NORMAL
EKG P AXIS: 63 DEGREES
EKG P AXIS: 63 DEGREES
EKG P AXIS: 67 DEGREES
EKG P AXIS: 67 DEGREES
EKG P AXIS: 78 DEGREES
EKG P AXIS: 78 DEGREES
EKG P-R INTERVAL: 100 MS
EKG P-R INTERVAL: 100 MS
EKG P-R INTERVAL: 126 MS
EKG P-R INTERVAL: 126 MS
EKG P-R INTERVAL: 128 MS
EKG P-R INTERVAL: 128 MS
EKG Q-T INTERVAL: 316 MS
EKG Q-T INTERVAL: 316 MS
EKG Q-T INTERVAL: 336 MS
EKG Q-T INTERVAL: 336 MS
EKG Q-T INTERVAL: 348 MS
EKG Q-T INTERVAL: 348 MS
EKG QRS DURATION: 88 MS
EKG QRS DURATION: 88 MS
EKG QRS DURATION: 90 MS
EKG QRS DURATION: 90 MS
EKG QRS DURATION: 94 MS
EKG QRS DURATION: 94 MS
EKG QTC CALCULATION (BAZETT): 470 MS
EKG QTC CALCULATION (BAZETT): 470 MS
EKG QTC CALCULATION (BAZETT): 484 MS
EKG QTC CALCULATION (BAZETT): 484 MS
EKG QTC CALCULATION (BAZETT): 494 MS
EKG QTC CALCULATION (BAZETT): 494 MS
EKG R AXIS: 36 DEGREES
EKG R AXIS: 36 DEGREES
EKG R AXIS: 41 DEGREES
EKG R AXIS: 41 DEGREES
EKG R AXIS: 44 DEGREES
EKG R AXIS: 44 DEGREES
EKG T AXIS: -25 DEGREES
EKG T AXIS: -25 DEGREES
EKG T AXIS: -46 DEGREES
EKG T AXIS: -46 DEGREES
EKG T AXIS: 10 DEGREES
EKG T AXIS: 10 DEGREES
EKG VENTRICULAR RATE: 121 BPM
EKG VENTRICULAR RATE: 121 BPM
EKG VENTRICULAR RATE: 125 BPM
EKG VENTRICULAR RATE: 125 BPM
EKG VENTRICULAR RATE: 133 BPM
EKG VENTRICULAR RATE: 133 BPM
EPITH CASTS URNS QL MICRO: ABNORMAL /LPF
EPITH CASTS URNS QL MICRO: ABNORMAL /LPF
ERYTHROCYTE [DISTWIDTH] IN BLOOD BY AUTOMATED COUNT: 23.5 % (ref 11.5–14.5)
ERYTHROCYTE [DISTWIDTH] IN BLOOD BY AUTOMATED COUNT: 23.5 % (ref 11.5–14.5)
GLUCOSE SERPL-MCNC: 117 MG/DL (ref 65–100)
GLUCOSE SERPL-MCNC: 117 MG/DL (ref 65–100)
GLUCOSE UR STRIP.AUTO-MCNC: NEGATIVE MG/DL
GLUCOSE UR STRIP.AUTO-MCNC: NEGATIVE MG/DL
HCT VFR BLD AUTO: 25.2 % (ref 35–47)
HCT VFR BLD AUTO: 25.2 % (ref 35–47)
HCT VFR BLD AUTO: 30.6 % (ref 35–47)
HCT VFR BLD AUTO: 30.6 % (ref 35–47)
HGB BLD-MCNC: 6.9 G/DL (ref 11.5–16)
HGB BLD-MCNC: 6.9 G/DL (ref 11.5–16)
HGB BLD-MCNC: 8.5 G/DL (ref 11.5–16)
HGB BLD-MCNC: 8.5 G/DL (ref 11.5–16)
HGB UR QL STRIP: ABNORMAL
HGB UR QL STRIP: ABNORMAL
HISTORY CHECK: NORMAL
HISTORY CHECK: NORMAL
KETONES UR QL STRIP.AUTO: NEGATIVE MG/DL
KETONES UR QL STRIP.AUTO: NEGATIVE MG/DL
LEUKOCYTE ESTERASE UR QL STRIP.AUTO: ABNORMAL
LEUKOCYTE ESTERASE UR QL STRIP.AUTO: ABNORMAL
MAGNESIUM SERPL-MCNC: 1.9 MG/DL (ref 1.6–2.4)
MAGNESIUM SERPL-MCNC: 1.9 MG/DL (ref 1.6–2.4)
MCH RBC QN AUTO: 18 PG (ref 26–34)
MCH RBC QN AUTO: 18 PG (ref 26–34)
MCHC RBC AUTO-ENTMCNC: 27.4 G/DL (ref 30–36.5)
MCHC RBC AUTO-ENTMCNC: 27.4 G/DL (ref 30–36.5)
MCV RBC AUTO: 65.8 FL (ref 80–99)
MCV RBC AUTO: 65.8 FL (ref 80–99)
NITRITE UR QL STRIP.AUTO: NEGATIVE
NITRITE UR QL STRIP.AUTO: NEGATIVE
NRBC # BLD: 0.02 K/UL (ref 0–0.01)
NRBC # BLD: 0.02 K/UL (ref 0–0.01)
NRBC BLD-RTO: 0.4 PER 100 WBC
NRBC BLD-RTO: 0.4 PER 100 WBC
OTHER: ABNORMAL
OTHER: ABNORMAL
PH UR STRIP: 6 (ref 5–8)
PH UR STRIP: 6 (ref 5–8)
PLATELET # BLD AUTO: 183 K/UL (ref 150–400)
PLATELET # BLD AUTO: 183 K/UL (ref 150–400)
POTASSIUM SERPL-SCNC: 3.4 MMOL/L (ref 3.5–5.1)
POTASSIUM SERPL-SCNC: 3.4 MMOL/L (ref 3.5–5.1)
PROT UR STRIP-MCNC: 100 MG/DL
PROT UR STRIP-MCNC: 100 MG/DL
RBC # BLD AUTO: 3.83 M/UL (ref 3.8–5.2)
RBC # BLD AUTO: 3.83 M/UL (ref 3.8–5.2)
RBC #/AREA URNS HPF: ABNORMAL /HPF (ref 0–5)
RBC #/AREA URNS HPF: ABNORMAL /HPF (ref 0–5)
SODIUM SERPL-SCNC: 136 MMOL/L (ref 136–145)
SODIUM SERPL-SCNC: 136 MMOL/L (ref 136–145)
SP GR UR REFRACTOMETRY: 1.01
SP GR UR REFRACTOMETRY: 1.01
TROPONIN I SERPL HS-MCNC: 4719 NG/L (ref 0–51)
TROPONIN I SERPL HS-MCNC: 4719 NG/L (ref 0–51)
URINE CULTURE IF INDICATED: ABNORMAL
URINE CULTURE IF INDICATED: ABNORMAL
UROBILINOGEN UR QL STRIP.AUTO: 0.2 EU/DL (ref 0.2–1)
UROBILINOGEN UR QL STRIP.AUTO: 0.2 EU/DL (ref 0.2–1)
WBC # BLD AUTO: 5 K/UL (ref 3.6–11)
WBC # BLD AUTO: 5 K/UL (ref 3.6–11)
WBC URNS QL MICRO: ABNORMAL /HPF (ref 0–4)
WBC URNS QL MICRO: ABNORMAL /HPF (ref 0–4)

## 2024-08-26 PROCEDURE — 87086 URINE CULTURE/COLONY COUNT: CPT

## 2024-08-26 PROCEDURE — 6360000002 HC RX W HCPCS: Performed by: INTERNAL MEDICINE

## 2024-08-26 PROCEDURE — 85597 PHOSPHOLIPID PLTLT NEUTRALIZ: CPT

## 2024-08-26 PROCEDURE — 2580000003 HC RX 258: Performed by: NURSE PRACTITIONER

## 2024-08-26 PROCEDURE — 6370000000 HC RX 637 (ALT 250 FOR IP): Performed by: NURSE PRACTITIONER

## 2024-08-26 PROCEDURE — 6360000002 HC RX W HCPCS: Performed by: NURSE PRACTITIONER

## 2024-08-26 PROCEDURE — 83735 ASSAY OF MAGNESIUM: CPT

## 2024-08-26 PROCEDURE — 97116 GAIT TRAINING THERAPY: CPT

## 2024-08-26 PROCEDURE — 85730 THROMBOPLASTIN TIME PARTIAL: CPT

## 2024-08-26 PROCEDURE — 36415 COLL VENOUS BLD VENIPUNCTURE: CPT

## 2024-08-26 PROCEDURE — 36430 TRANSFUSION BLD/BLD COMPNT: CPT

## 2024-08-26 PROCEDURE — 85018 HEMOGLOBIN: CPT

## 2024-08-26 PROCEDURE — 97161 PT EVAL LOW COMPLEX 20 MIN: CPT

## 2024-08-26 PROCEDURE — 97166 OT EVAL MOD COMPLEX 45 MIN: CPT

## 2024-08-26 PROCEDURE — 86148 ANTI-PHOSPHOLIPID ANTIBODY: CPT

## 2024-08-26 PROCEDURE — 85027 COMPLETE CBC AUTOMATED: CPT

## 2024-08-26 PROCEDURE — 2580000003 HC RX 258: Performed by: INTERNAL MEDICINE

## 2024-08-26 PROCEDURE — 85613 RUSSELL VIPER VENOM DILUTED: CPT

## 2024-08-26 PROCEDURE — 85306 CLOT INHIBIT PROT S FREE: CPT

## 2024-08-26 PROCEDURE — 80048 BASIC METABOLIC PNL TOTAL CA: CPT

## 2024-08-26 PROCEDURE — 76856 US EXAM PELVIC COMPLETE: CPT

## 2024-08-26 PROCEDURE — 85301 ANTITHROMBIN III ANTIGEN: CPT

## 2024-08-26 PROCEDURE — 87186 SC STD MICRODIL/AGAR DIL: CPT

## 2024-08-26 PROCEDURE — 85303 CLOT INHIBIT PROT C ACTIVITY: CPT

## 2024-08-26 PROCEDURE — 85300 ANTITHROMBIN III ACTIVITY: CPT

## 2024-08-26 PROCEDURE — 86147 CARDIOLIPIN ANTIBODY EA IG: CPT

## 2024-08-26 PROCEDURE — 84484 ASSAY OF TROPONIN QUANT: CPT

## 2024-08-26 PROCEDURE — 1100000003 HC PRIVATE W/ TELEMETRY

## 2024-08-26 PROCEDURE — 97530 THERAPEUTIC ACTIVITIES: CPT

## 2024-08-26 PROCEDURE — 99233 SBSQ HOSP IP/OBS HIGH 50: CPT

## 2024-08-26 PROCEDURE — 92523 SPEECH SOUND LANG COMPREHEN: CPT

## 2024-08-26 PROCEDURE — 86146 BETA-2 GLYCOPROTEIN ANTIBODY: CPT

## 2024-08-26 PROCEDURE — P9016 RBC LEUKOCYTES REDUCED: HCPCS

## 2024-08-26 PROCEDURE — 85014 HEMATOCRIT: CPT

## 2024-08-26 PROCEDURE — 87088 URINE BACTERIA CULTURE: CPT

## 2024-08-26 PROCEDURE — 81001 URINALYSIS AUTO W/SCOPE: CPT

## 2024-08-26 PROCEDURE — 81241 F5 GENE: CPT

## 2024-08-26 RX ORDER — SODIUM CHLORIDE 9 MG/ML
INJECTION, SOLUTION INTRAVENOUS PRN
Status: DISCONTINUED | OUTPATIENT
Start: 2024-08-26 | End: 2024-08-30 | Stop reason: HOSPADM

## 2024-08-26 RX ORDER — CALCIUM CARBONATE 500 MG/1
500 TABLET, CHEWABLE ORAL 3 TIMES DAILY PRN
Status: DISCONTINUED | OUTPATIENT
Start: 2024-08-26 | End: 2024-08-30 | Stop reason: HOSPADM

## 2024-08-26 RX ORDER — POTASSIUM CHLORIDE 1500 MG/1
40 TABLET, EXTENDED RELEASE ORAL ONCE
Status: COMPLETED | OUTPATIENT
Start: 2024-08-26 | End: 2024-08-26

## 2024-08-26 RX ADMIN — ASPIRIN 81 MG: 81 TABLET, CHEWABLE ORAL at 11:23

## 2024-08-26 RX ADMIN — POTASSIUM CHLORIDE 40 MEQ: 1500 TABLET, EXTENDED RELEASE ORAL at 05:11

## 2024-08-26 RX ADMIN — POTASSIUM BICARBONATE 40 MEQ: 782 TABLET, EFFERVESCENT ORAL at 17:48

## 2024-08-26 RX ADMIN — ACETAMINOPHEN 650 MG: 325 TABLET ORAL at 02:26

## 2024-08-26 RX ADMIN — ONDANSETRON 4 MG: 2 INJECTION INTRAMUSCULAR; INTRAVENOUS at 05:20

## 2024-08-26 RX ADMIN — SODIUM CHLORIDE, PRESERVATIVE FREE 10 ML: 5 INJECTION INTRAVENOUS at 21:23

## 2024-08-26 RX ADMIN — POTASSIUM CHLORIDE 10 MEQ: 7.46 INJECTION, SOLUTION INTRAVENOUS at 04:57

## 2024-08-26 RX ADMIN — ACETAMINOPHEN 650 MG: 325 TABLET ORAL at 11:23

## 2024-08-26 RX ADMIN — CALCIUM CARBONATE (ANTACID) CHEW TAB 500 MG 500 MG: 500 CHEW TAB at 03:04

## 2024-08-26 RX ADMIN — PANTOPRAZOLE SODIUM 40 MG: 40 INJECTION, POWDER, FOR SOLUTION INTRAVENOUS at 03:04

## 2024-08-26 RX ADMIN — ATORVASTATIN CALCIUM 80 MG: 40 TABLET, FILM COATED ORAL at 21:23

## 2024-08-26 RX ADMIN — SODIUM CHLORIDE, PRESERVATIVE FREE 10 ML: 5 INJECTION INTRAVENOUS at 08:21

## 2024-08-26 RX ADMIN — CEFTRIAXONE SODIUM 1000 MG: 1 INJECTION, POWDER, FOR SOLUTION INTRAMUSCULAR; INTRAVENOUS at 10:55

## 2024-08-26 RX ADMIN — PANTOPRAZOLE SODIUM 40 MG: 40 INJECTION, POWDER, FOR SOLUTION INTRAVENOUS at 15:21

## 2024-08-26 ASSESSMENT — PAIN SCALES - GENERAL
PAINLEVEL_OUTOF10: 0
PAINLEVEL_OUTOF10: 2
PAINLEVEL_OUTOF10: 5
PAINLEVEL_OUTOF10: 0
PAINLEVEL_OUTOF10: 0
PAINLEVEL_OUTOF10: 5

## 2024-08-26 ASSESSMENT — PAIN DESCRIPTION - ORIENTATION
ORIENTATION: MID;UPPER
ORIENTATION: MID;UPPER
ORIENTATION: UPPER

## 2024-08-26 ASSESSMENT — PAIN - FUNCTIONAL ASSESSMENT: PAIN_FUNCTIONAL_ASSESSMENT: ACTIVITIES ARE NOT PREVENTED

## 2024-08-26 ASSESSMENT — PAIN DESCRIPTION - LOCATION
LOCATION: ABDOMEN

## 2024-08-26 ASSESSMENT — PAIN DESCRIPTION - DESCRIPTORS
DESCRIPTORS: ACHING

## 2024-08-26 NOTE — CONSENT
Informed Consent for Blood Component Transfusion Note    I have discussed with the patient the rationale for blood component transfusion; its benefits in treating or preventing fatigue, organ damage, or death; and its risk which includes mild transfusion reactions, rare risk of blood borne infection, or more serious but rare reactions. I have discussed the alternatives to transfusion, including the risk and consequences of not receiving transfusion. The patient had an opportunity to ask questions and had agreed to proceed with transfusion of blood components.    Electronically signed by SHAVON Cesar NP on 8/25/24 at 9:36 PM EDT

## 2024-08-26 NOTE — PROGRESS NOTES
1900: Bedside shift change report received from Carlyn Wood RN and Won Hannah RN (offgoing nurse). Report included the following information Nurse Handoff Report, Index, Adult Overview, Intake/Output, MAR, Recent Results, Cardiac Rhythm ST, and Neuro Assessment. Bedside NIH completed by primary RN and offgoing RN.     2000: Shift assessment and NIH completed, see flowsheets for details     2024: Pt transported off floor by primary RN on [facility] for CT scan    0000: Reassessment completed, see flowsheets for details. NIH: 2    0400: Reassessment completed, see flowsheets for details. NIH: 2    0430: J CARLOS Davison NP notified of pts hgb: 6.9 and troponin: 4719, additional orders to be placed by NP    0504: Pt unable to tolerate IV potassium replacement, J CARLOS Davison NP notified. Orders received for 40 mEq potassium PO once, orders placed.     0615: Dr. Castro at bedside, updated on patient status. Discussed plan for today w/ pt and .     0700: Bedside shift change report given to ELVER Nicole (oncoming nurse) by ELVER Mott (offgoing nurse). Report included the following information Nurse Handoff Report, Index, Adult Overview, Intake/Output, MAR, Recent Results, and Cardiac Rhythm SR .

## 2024-08-26 NOTE — PROGRESS NOTES
PHYSICAL THERAPY EVALUATION/DISCHARGE    Patient: Isabella Gomez (48 y.o. female)  Date: 8/26/2024  Primary Diagnosis: Acute CVA (cerebrovascular accident) (HCC) [I63.9]  Procedure(s) (LRB):  MYOMECTOMY, POSSIBLE ABDOMINAL HYSTERECTOMY (N/A)     Precautions:                        ASSESSMENT AND RECOMMENDATIONS:  Based on the objective data below, the patient is at her baseline for functional mobility. She performed all mobility at an independent level. Performed MMT at EOB and BLE 5/5. Stood and performed the MILES, results listed below. Confirmed that all mobility was baseline and no concerns. Pt returned to bed due to receiving blood transfusion.     Functional Outcome Measure:  The patient scored 56/56 on the MILES outcome measure which is indicative of low fall risk.          Further skilled acute physical therapy is not indicated at this time.       PLAN :  Recommendation for discharge: (in order for the patient to meet his/her long term goals):   No skilled physical therapy    Other factors to consider for discharge: no additional factors    IF patient discharges home will need the following DME: none       SUBJECTIVE:   Patient stated “only the speech trouble.”    OBJECTIVE DATA SUMMARY:   No past medical history on file.No past surgical history on file.    Home Situation and Prior Level of Function: independent  Social/Functional History  Lives With: Significant other  Type of Home: House  Home Layout: Two level  Home Access: Stairs to enter with rails  Entrance Stairs - Number of Steps: 4  Entrance Stairs - Rails: Both  Bathroom Shower/Tub: Tub/Shower unit  Bathroom Equipment: None  Home Equipment: None  Critical Behavior:  Orientation  Overall Orientation Status: Within Functional Limits       Hearing:   Hearing  Hearing: Within functional limits    Vision/Perceptual:          Vision  Vision: Within Functional Limits       Strength:    Strength: Within functional limits    Tone & Sensation:   Tone:  Normal  Sensation: Intact    Coordination:  Coordination: Within functional limits    Range Of Motion:  AROM: Within functional limits  PROM: Within functional limits    Functional Mobility:  Bed Mobility:     Bed Mobility Training  Bed Mobility Training: Yes  Overall Level of Assistance: Independent  Rolling: Independent  Supine to Sit: Independent  Sit to Supine: Independent  Scooting: Independent  Transfers:     Transfer Training  Transfer Training: Yes  Overall Level of Assistance: Independent  Sit to Stand: Independent  Stand to Sit: Independent  Balance:               Balance  Sitting: Intact  Standing: Intact  Ambulation/Gait Training:                       Gait  Gait Training: Yes  Overall Level of Assistance: Independent  Distance (ft): 30 Feet                                                                                                                                                                                                                                                      Intervention/Education specific to: \"Stroke diagnoses\"        Patient and/or family was verbally and visually educated on the BE FAST acronym for signs/symptoms of CVA and TIA.  All questions answered with patient indicating good  understanding.     Meraz Balance Test:    Meraz Balance Scale  1. Sitting to Standing: Able to stand without using hands and stabilize independently  2. Standing Unsupported: Able to stand safely for 2 minutes  3. Sitting with Back Unsupported but Feet Supported on Floor or on a Stool: Able to sit safely and securely for 2 minutes  4. Standing to Sitting: Sits safely with minimal use of hands  5.  Transfers: Able to transfer safely with minor use of hands  6. Standing Unsupported with Eyes Closed: Able to stand 10 seconds safely  7. Standing Unsupported with Feet Together: Able to place feet together independently and stand 1 minute safely  8. Reach Forward with Outstretched Arm While Standing: Can

## 2024-08-26 NOTE — PROGRESS NOTES
End of Shift Note    Bedside shift change report given to  ELVER Vences  (oncoming nurse) by Alisha Carbajal RN .        Shift worked:  Days   Shift summary and any significant changes:    Patient admitted and oriented to unit  Added to CVA pathway  Admission assessment charted  Pelvic ultrasound completed     Concerns for physician to address:  None   Zone phone for oncoming shift:  [phone]     Patient Information  Isabella Gomez  48 y.o.  8/24/2024  8:11 PM by Laine Michel MD. Isabella Gomez was admitted from [facility]    Problem List  Patient Active Problem List    Diagnosis Date Noted    Acute CVA (cerebrovascular accident) (HCC) 08/24/2024     History reviewed. No pertinent past medical history.    Core Measures:  CVA: yes  CHF: no  PNA: no    Activity:  Level of Assistance: Standby assist, set-up cues, supervision of patient - no hands on  Number times ambulated in hallways past shift: 0  Number of times OOB to chair past shift: 0    Cardiac:   Cardiac Monitoring: yes    Access:   Current line(s): PIV    Respiratory:   O2 Device: None (Room air)    GI:  Last BM (including prior to admit): 08/26/24  Current diet:  ADULT DIET; Regular  Diet NPO  Tolerating current diet: Yes    Pain Management:   Patient states pain is manageable on current regimen: yes    Skin:  Ancelmo Scale Score: 21  Interventions: dual skin   Pressure injury: no    Patient Safety:  Fall Score: Rosales Total Score: 20  Interventions: stay with me program  Self-release roll belt: No  Dexterity to release roll belt: N/A   (must document dexterity  here by stating Yes or No here, otherwise this is a restraint and must follow restraint documentation policy.)    DVT prophylaxis:  DVT prophylaxis: SCD    Active Consults:  IP CONSULT TO TELE-NEUROLOGY  IP CONSULT TO NEUROLOGY  IP CONSULT TO NEUROLOGY  IP CONSULT TO OB GYN  IP CONSULT TO CARDIOLOGY  IP CONSULT TO SPIRITUAL SERVICES    Length of Stay:  Expected LOS: 7  Actual LOS: 2    Alisha Carbajal  RN

## 2024-08-26 NOTE — PROGRESS NOTES
GYN     Received 1 unit PRBC overnight.  Hbg increased from 5.6 to 6.9.  Tachycardia resolved.  Thin, small, serosanguinous vaginal discharge continues.    P:  1.  Needs second unit of PRBC as still < 7 and planned for at least a myomectomy on 8/28  2.  Pelvic US today  3.  NPO at [facility] on Tuesday

## 2024-08-26 NOTE — PROGRESS NOTES
Attended Interdisciplinary rounds in CCU; patient care was discussed.     Visited by: Chaplain Steve Dubose M.Div., [facility].   Paging Service: [phone])

## 2024-08-26 NOTE — PROGRESS NOTES
HOSPITAL NEUROLOGY NOTE     Chief Complaint   Patient presents with    Aphasia     Difficulty finding words and following commands due to processing words used.         \[facility] [place] [facility]\""  History excerpted from Dr. Shah's note on 8/25/2024  \"Isabella Gomez is a 48 y.o. female  with no significant past medical history presenting with acute onset of word finding difficulties, found to have a distal left M3 occlusion, received thrombolytic therapy, transferred to the ICU.     Patient is aphasic and has some difficulty providing history.  She states that she had no slurred speech only difficulty with her words.  She is still having persistent aphasia.  She has no other neurological symptoms.     She had a head CT which showed no acute intracranial process, CTA head and neck, CTP revealing for left M3 segment occlusion with delayed perfusion in the left posterior temporal/occipital lobe.  Discussion with neuroIR which did not recommend thrombectomy\".        INTERIM DATA: At the time of my evaluation this morning, patient was awake and alert.  She was very aphasic.  She followed commands in all extremities without any difficulty.  She was receiving blood transfusion.     was at bedside.      ROS  A ten system review of constitutional, cardiovascular, respiratory, musculoskeletal, endocrine, skin, SHEENT, genitourinary, psychiatric and neurologic systems was obtained and is unremarkable except as stated in HPI     PMH  No past medical history on file.    ALLERGIES  No Known Allergies    PHYSICAL EXAMINATION:     General:   General appearance: Pt is in no acute distress   Distal pulses are preserved  Fundoscopic exam: attempted    Neurological Examination:   Mental Status: Awake and alert.  Severe expressive aphasia.  Difficulty naming, reading, and writing.  Follows commands without difficulty. Attention and comprehension intact.    Cranial Nerves: Visual fields are full. PERRL, Extraocular movements are full. Facial  frontotemporal with punctate foci of infarction at the right and left parietal lobes and right frontal lobe as  well. There is no superimposed hemorrhage, midline shift or mass effect. No additional acute intracranial process is demonstrated.    TTE completed on 8/25/2024: Low normal left ventricular systolic function with a visually estimated EF of 50-55%.  Mild increased wall thickness.  No mention of PFO or thrombus.    Protein S activity: Pending    Protein C activity: Pending    Factor V Leiden: Pending    Antithrombin panel: Pending    Antiphospholipid syndrome panel: Pending    Stroke labs:  HgBA1c    Hemoglobin A1C   Date Value Ref Range Status   08/25/2024 <3.8 (L) 4.0 - 5.6 % Final     Comment:     RESULTS REPEATED  (NOTE)  HbA1C Interpretive Ranges  <5.7              Normal  5.7 - 6.4         Consider Prediabetes  >6.5              Consider Diabetes          LDL   Lab Results   Component Value Date    CHOL 83 08/25/2024    TRIG 94 08/25/2024    HDL 30 08/25/2024    LDL 34.2 08/25/2024    VLDL 18.8 08/25/2024    CHOLHDLRATIO 2.8 08/25/2024          IMPRESSION:  Isabella Gomez is a 48 y.o. female who presents with distal left M3 occlusion with perfusion deficit, now with evolving left MCA territory effect on repeat head CT, in the setting of acute onset of aphasia.  Her current neurological examination reveals no focal sensory or motor deficits, severe aphasia.    - Head CT without contrast showed no acute intracranial process.  There is no intracranial mass or hemorrhage.    - CTA head and neck revealed left M3 segment occlusion, with correlating perfusion defect. No evidence for intracranial aneurysm or hemodynamically significant stenosis.    - CT brain perfusion revealed an area of delayed perfusion in the left posterior temporal/occipital lobe, correlating with the left M3 segment occlusion.    - Repeat head CT without contrast showed moderate sized infarction left temporoparietal lobe. No

## 2024-08-26 NOTE — PLAN OF CARE
Speech LAnguage Pathology EVALUATION    Patient: Isabella Gomez (48 y.o. female)  Date: 8/26/2024  Primary Diagnosis: Acute CVA (cerebrovascular accident) (HCC) [I63.9]  Procedure(s) (LRB):  MYOMECTOMY, POSSIBLE ABDOMINAL HYSTERECTOMY (N/A)     Precautions:                     ASSESSMENT :  Patient presents with expressive language characterized by word finding difficulty, paraphasias, reduced repetition, and some difficulty with automatic speech. Patient able to count 1-10 but with difficulty stating days of the week, even with tactile cues. Patient with inconsistent ability to complete repetition tasks (single syllable words- 100%, multi syllable words- 80%, phrase level- 70%). Written and auditory cues beneficial. Cues to use circumlocution for word finding strategies intermittently. SLP educated patient/family on word finding strategies and energy allocation model.     Patient will benefit from skilled intervention to address the above impairments.     PLAN :  Recommendations and Planned Interventions:  --Ask yes/no questions   --Eliminate distractions   --Anticipate patient's wants/needs   --Cue word finding strategies   --Utilize written/visual aids        Recommend next SLP session: continued diagnostic speech-language treatment and patient/family/staff education     Acute SLP Services: SLP Plan of Care: 2 times/week. Patient's rehabilitation potential is considered to be Good.  Discharge Recommendations: Yes, recommend SLP treatment at next level of care     SUBJECTIVE:   Patient stated, “I'm practicing.\"     OBJECTIVE:   No past medical history on file.No past surgical history on file.  Prior Level of Function/Home Situation:   Social/Functional History  Lives With: Significant other  Type of Home: House  Home Layout: Two level  Home Access: Stairs to enter with rails  Entrance Stairs - Number of Steps: 4  Entrance Stairs - Rails: Both  Bathroom Shower/Tub: Tub/Shower unit  Bathroom Equipment:  None  Home Equipment: None    Baseline Assessment:                Cognitive and Communication Status:  Neurologic State: Alert  Orientation Level: Oriented to person and Oriented to place  Cognition: Appropriate for age attention/concentration    Dysphagia:  Oral Assessment:  Oral Motor   Labial: No impairment  Dentition: Natural  Oral Hygiene: Clean  Lingual: No impairment  Velum: Unable to visualize  P.O. Trials:               Motor Speech:         Language Comprehension and Expression:  Auditory Comprehension  Comprehension: Exceptions  Simple yes/no questions: WFL  Moderate yes/no questions: WFL  Complex yes/no questions: Mild  One Step Commands: WFL  Complex/Abstract Commands: Mild     Expression  Primary Mode of Expression: Verbal  Verbal Expression  Verbal Expression: Exceptions to functional limits  Repetition: Mild  Automatic Speech: Mild  Confrontation: Moderate  Conversation: Moderate  Interfering Components: Perseverations;Jargon  Effective Techniques: Word retrived strategies (visual/written cues)  Written Expression  Dominant Hand: Right  Written Expression: Within Functional Limits    Neuro-Linguistics:                      Voice:       Respiratory Status/Airway:  Room air                         Outcome Measure:  Functional Oral Intake Scale (FOIS): 7--Total oral diet with no restrictions    After treatment:   Patient left in no apparent distress in bed, Nursing notified, and Caregiver present    COMMUNICATION/EDUCATION:   Patient was educated regarding role of SLP and word finding strategies.  She demonstrated Good understanding as evidenced by Verbalizing understanding and Nodding.    The patient's plan of care including recommendations, planned interventions, and recommended diet changes were discussed with: Registered nurse    Patient/family have participated as able in goal setting and plan of care and Patient/family agree to work toward stated goals and plan of care    Thank you,  Elicia

## 2024-08-26 NOTE — PROGRESS NOTES
0700  Report received from Tiera RAYA    0800  Assessment complete  Patient awake alert oriented to person type of place and situation not time has expressive aphasia unable to tell me her age or the month at this time NIH 4  on room air 1 person assist to commode voiding urine sent per orders #18 right FA and #20 in left AC both flushed and capped 1 unit of packed RBC to be given EOMI intact KRSITY no drifts in arms or legs  strong and equal sensation intact.   at bedside    0806  Blood started see flow sheet    1200  Assessment complete more oriented now able to write down her age and month on paper still remains with the expressive aphasia NIH 2    1328  Labs sent per orders to include H&H    1359  Report given to Alisha RAYA for transfer to Neuro unit

## 2024-08-26 NOTE — INTERDISCIPLINARY ROUNDS
Critical care interdisciplinary rounds today.  Following members present: Case Management, , Nursing, Nutrition, Pharmacy, and Physician.  To transfer later today.  Plan of care discussed.  See clinical pathway for plan of care and interventions and desired outcomes.

## 2024-08-26 NOTE — PROGRESS NOTES
OCCUPATIONAL THERAPY EVALUATION/DISCHARGE  Patient: Isabella Gomez (48 y.o. female)  Date: 8/26/2024  Primary Diagnosis: Acute CVA (cerebrovascular accident) (HCC) [I63.9]  Procedure(s) (LRB):  MYOMECTOMY, POSSIBLE ABDOMINAL HYSTERECTOMY (N/A)       Precautions:                    ASSESSMENT :  Based on the objective data below, the patient presents to be at their independent baseline for functional mobility, transfers, and ADL completion. During ambulation in room pt did not require the use of DME and no LOB noted. BUE MMT noted to be 5/5. Pt did not state any impaired sensation, but did demonstrate some difficulty w/ word finding primarily w/ family names. Pt stated that how she is currently completing ADLs is her baseline. Pt independently returned to supine in bed to finish her blood transfusion. Pt is discharged from acute care OT services.     Functional Outcome Measure:  The patient scored 66/66 on the Fugl Beck  outcome measure which is indicative of low impairment from recent neurological event.      Further skilled acute occupational therapy is not indicated at this time.     PLAN :    Recommendation for discharge: (in order for the patient to meet his/her long term goals):   No skilled occupational therapy    Other factors to consider for discharge: no additional factors    IF patient discharges home will need the following DME: none     SUBJECTIVE:   Patient stated, “Its on the tip of my tongue.”    OBJECTIVE DATA SUMMARY:   No past medical history on file.No past surgical history on file.    Prior Level of Function/Environment/Context: pt was independent w/ ADLs and ambulation.   ,  ,  ,  ,  ,  ,  ,  ,  ,  ,       Expanded or extensive additional review of patient history:   Social/Functional History  Lives With: Significant other  Type of Home: House  Home Layout: Two level  Home Access: Stairs to enter with rails  Entrance Stairs - Number of Steps: 4  Entrance Stairs - Rails: Both  Bathroom  blood transfusion     COMMUNICATION/EDUCATION:   The patient's plan of care was discussed with: physical therapist and registered nurse    Patient Education  Education Given To: Patient  Education Provided: Role of Therapy;Plan of Care;Energy Conservation  Education Provided Comments: BEFAST  Education Method: Verbal  Barriers to Learning: None  Education Outcome: Verbalized understanding    Thank you for this referral.  Girma Novoa OT  Minutes: 16    Occupational Therapy Evaluation Charge Determination   History Examination Decision-Making   MEDIUM Complexity : Expanded review of history including physical, cognitive and psychocial history  MEDIUM Complexity: 3-5 Performance deficits relating to physical, cognitive, or psychosocial skills that result in activity limitations and/or participation restrictions MEDIUM Complexity: Patient may present with comorbidities that affect occupational performance. Minimal to moderate modifications of tasks or assist (eg. physical or verbal) with assist is necessary to enable pt to complete eval   Based on the above components, the patient evaluation is determined to be of the following complexity level: Medium

## 2024-08-26 NOTE — PROGRESS NOTES
ICU Progress Note        Subjective:    24 - awake and interactive                 C/o mild HA this am                 Continued word finding deficits                 Awaiting PRBCs for transfusion from [place]   - lying comfortably on the bed. Denies any complaints.     DEV Gomez is a 47 yo female with a PMH of PAOLO (not on supplementation) who presented to the ED with acute onset expressive aphasia that began 45 min prior to ED arrival.  She was given TNK in the ED after head CT was unrevealing.  She did not have other neurological deficits. CT head and neck showed L M3 segment occlusion with correlating perfusion defect.  I spoke with Dr Cortés, on call for NIS.  No intervention indicated from NIS standpoint.       Vital Signs:    /82   Pulse (!) 117   Temp 98.3 °F (36.8 °C) (Oral)   Resp 19   Ht 1.651 m (5' 5\")   Wt 56.7 kg (125 lb)   SpO2 97%   BMI 20.80 kg/m²             Temp (24hrs), Av.7 °F (37.1 °C), Min:98.3 °F (36.8 °C), Max:99.7 °F (37.6 °C)       Intake/Output:   Last shift:       07 - 1900  In: 336.7   Out: -   Last 3 shifts: 1901 -  07  In: 307.5   Out:  [Urine:]    Intake/Output Summary (Last 24 hours) at 2024 1118  Last data filed at 2024 1048  Gross per 24 hour   Intake 644.17 ml   Output 875 ml   Net -230.83 ml         Physical Exam:    General:  Not in acute distress  HEENT:  Anicteric sclerae; pink palpebral conjunctivae; mucosa moist  Resp:  Bilateral air entry +, no crackles or wheeze  CV:  S1, S2 present  GI:  Abdomen soft, non-tender; (+) active bowel sounds  Extremities:  +2 pulses on all extremities; no edema/ cyanosis/ clubbing noted  Skin:  Warm; no rashes/ lesions noted  Neurologic:  Alert, awake and oriented. No motor or sensory deficit.     DATA:     Current Facility-Administered Medications   Medication Dose Route Frequency    pantoprazole (PROTONIX) 40 mg in sodium chloride (PF) 0.9 % 10 mL  superimposed hemorrhage, midline shift or mass effect..      No additional acute intracranial process is demonstrated.            Electronically signed by PORFIRIO IRWIN      CT HEAD WO CONTRAST   Final Result   Evolving left MCA territory infarction. No acute intracranial hemorrhage.            Electronically signed by Hank Guzman MD      CTA CHEST ABDOMEN PELVIS W CONTRAST   Final Result   1. Prominent uterine fibroids, including one which appears to extend into the   vaginal canal. These have prominent feeding arteries. Unable to confidently   evaluate for active bleeding due to monophasic exam. No intraabdominal bleeding.      This was discussed with Dr. Mccoy by Dr. Kim at 9:59pm on 8-24-24.      Electronically signed by RODDY KIM      CTA HEAD NECK W CONTRAST   Final Result      1. Left M3 segment occlusion, with correlating perfusion defect.      This was communicated to Dr. Stone by Dr. Kim at 9:31pm on 8-24-24.      Electronically signed by RODDY KIM      CT BRAIN PERFUSION   Final Result      1. Left M3 segment occlusion, with correlating perfusion defect.      This was communicated to Dr. Stone by Dr. Kim at 9:31pm on 8-24-24.      Electronically signed by RODDY KIM      CT HEAD WO CONTRAST   Final Result   No acute intracranial process.   There is no intracranial mass or hemorrhage.      Electronically signed by PORFIRIO IRWIN      US PELVIS COMPLETE    (Results Pending)       Assessment and Plan:    CVA s/p TNK w distal L M3 segment occlusion  - Received TNK  - Doing well.   - Left tempo-parietal lobe ischemia  - On aspirin and statin  - Neurology following.     Elevated troponin - in the setting of LVH. EKG without ST-T wave elevation. ECHO done on 08/25 with normal EF, no wall motion abnormality.    Acute blood loss anemia - in the setting of fibroid uterus. OB following with plans for surgery on Wednesday. Cont. PRBC transfusion as needed to keep Hb >7 gm/dl.     Full code.     Family at

## 2024-08-26 NOTE — PROGRESS NOTES
Hospitalist Progress Note    NAME:   Isabella Gomez   : 1975   MRN: [id number]     Date/Time: 2024 3:46 PM  Patient PCP: No primary care provider on file.    Estimated discharge date:   Barriers: GYN cl, Neuro Cl    ICU transfer:   Linnea Gomez is a 47 yo female with a PMH of PAOLO (not on supplementation) who presented to the ED with acute onset expressive aphasia that began 45 min prior to ED arrival.  She was given TNK in the ED. CT head and neck showed L M3 segment occlusion with correlating perfusion defect.  Transferred out ICU 2024     Assessment / Plan:  Acute onset expressive aphasia   CT head / neck noted L M3 segment occlusion   MRI 2024  TNK in ED - admitted to ICU   FINDINGS:   There is a moderate sized confluent cortical infarction temporoparietal lobe.  Punctate foci of infarction at the right and left frontal lobes. There is no superimposed hemorrhage, midline shift or mass effect.  Neurology following   Stroke work up   Excerpt : Head CT without contrast completed on 2024: No acute intracranial process.  There is no intracranial mass or hemorrhage.     CTA head and neck completed on 2024: Left M3 segment occlusion, with correlating perfusion defect. No evidence for intracranial aneurysm or hemodynamically significant stenosis.     CT brain perfusion completed on 2024: There is an area of delayed perfusion in the left posterior temporal/occipital lobe, correlating with the left M3 segment occlusion.     Repeat head CT without contrast on 2024: Moderate sized infarction left temporoparietal lobe. No superimposed hemorrhage or midline shift.     MRI Brain without contrast completed on 2024: Moderate-sized acute cortical infarction left frontotemporal with punctate foci of infarction at the right and left parietal lobes and right frontal lobe as  well. There is no superimposed hemorrhage, midline shift or mass effect. No additional acute

## 2024-08-27 ENCOUNTER — APPOINTMENT (OUTPATIENT)
Facility: HOSPITAL | Age: 49
End: 2024-08-27
Attending: INTERNAL MEDICINE
Payer: COMMERCIAL

## 2024-08-27 LAB
ABO + RH BLD: NORMAL
ABO + RH BLD: NORMAL
ANION GAP SERPL CALC-SCNC: 6 MMOL/L (ref 5–15)
ANION GAP SERPL CALC-SCNC: 6 MMOL/L (ref 5–15)
ANTIGENS PRESENT BLD: NORMAL
ANTIGENS PRESENT BLD: NORMAL
ANTIGENS PRESENT RBC DONR: NORMAL
AT III AG PPP IA-ACNC: 98 % (ref 72–124)
AT III AG PPP IA-ACNC: 98 % (ref 72–124)
AT III PPP CHRO-ACNC: 117 % (ref 75–135)
AT III PPP CHRO-ACNC: 117 % (ref 75–135)
BASOPHILS # BLD: 0 K/UL (ref 0–0.1)
BASOPHILS # BLD: 0 K/UL (ref 0–0.1)
BASOPHILS NFR BLD: 0 % (ref 0–1)
BASOPHILS NFR BLD: 0 % (ref 0–1)
BLD GP AB SCN SERPL QL ELUTION: NORMAL
BLD GP AB SCN SERPL QL ELUTION: NORMAL
BLD PROD TYP BPU: NORMAL
BLOOD BANK BLOOD PRODUCT EXPIRATION DATE: NORMAL
BLOOD BANK DISPENSE STATUS: NORMAL
BLOOD BANK ISBT PRODUCT BLOOD TYPE: 9500
BLOOD BANK PRODUCT CODE: NORMAL
BLOOD BANK UNIT TYPE AND RH: NORMAL
BLOOD GROUP ANTIBODIES SERPL: NORMAL
BPU ID: NORMAL
BUN SERPL-MCNC: 10 MG/DL (ref 6–20)
BUN SERPL-MCNC: 10 MG/DL (ref 6–20)
BUN/CREAT SERPL: 12 (ref 12–20)
BUN/CREAT SERPL: 12 (ref 12–20)
CALCIUM SERPL-MCNC: 8.9 MG/DL (ref 8.5–10.1)
CALCIUM SERPL-MCNC: 8.9 MG/DL (ref 8.5–10.1)
CHLORIDE SERPL-SCNC: 106 MMOL/L (ref 97–108)
CHLORIDE SERPL-SCNC: 106 MMOL/L (ref 97–108)
CO2 SERPL-SCNC: 26 MMOL/L (ref 21–32)
CO2 SERPL-SCNC: 26 MMOL/L (ref 21–32)
CREAT SERPL-MCNC: 0.81 MG/DL (ref 0.55–1.02)
CREAT SERPL-MCNC: 0.81 MG/DL (ref 0.55–1.02)
CROSSMATCH RESULT: NORMAL
DAT C3B/C3D, C3D: NORMAL
DAT C3B/C3D, C3D: NORMAL
DAT IGG-SP REAG RBC QL: NORMAL
DAT IGG-SP REAG RBC QL: NORMAL
DAT POLY-SP REAG RBC QL: NORMAL
DAT POLY-SP REAG RBC QL: NORMAL
DIFFERENTIAL METHOD BLD: ABNORMAL
DIFFERENTIAL METHOD BLD: ABNORMAL
ECHO AO ASC DIAM: 2.6 CM
ECHO AO ASC DIAM: 2.6 CM
ECHO AO ASCENDING AORTA INDEX: 1.6 CM/M2
ECHO AO ASCENDING AORTA INDEX: 1.6 CM/M2
ECHO AO ROOT DIAM: 2.8 CM
ECHO AO ROOT DIAM: 2.8 CM
ECHO AO ROOT DIAM: 3.4 CM
ECHO AO ROOT DIAM: 3.4 CM
ECHO AO ROOT INDEX: 1.73 CM/M2
ECHO AO ROOT INDEX: 1.73 CM/M2
ECHO AO ROOT INDEX: 2.1 CM/M2
ECHO AO ROOT INDEX: 2.1 CM/M2
ECHO AV AREA PEAK VELOCITY: 1.7 CM2
ECHO AV AREA PEAK VELOCITY: 1.7 CM2
ECHO AV AREA VTI: 1.8 CM2
ECHO AV AREA VTI: 1.8 CM2
ECHO AV AREA/BSA PEAK VELOCITY: 1 CM2/M2
ECHO AV AREA/BSA PEAK VELOCITY: 1 CM2/M2
ECHO AV AREA/BSA VTI: 1.1 CM2/M2
ECHO AV AREA/BSA VTI: 1.1 CM2/M2
ECHO AV CUSP MM: 1.8 CM
ECHO AV CUSP MM: 1.8 CM
ECHO AV MEAN GRADIENT: 6 MMHG
ECHO AV MEAN GRADIENT: 6 MMHG
ECHO AV MEAN VELOCITY: 1.2 M/S
ECHO AV MEAN VELOCITY: 1.2 M/S
ECHO AV PEAK GRADIENT: 10 MMHG
ECHO AV PEAK GRADIENT: 10 MMHG
ECHO AV PEAK GRADIENT: 13 MMHG
ECHO AV PEAK GRADIENT: 13 MMHG
ECHO AV PEAK VELOCITY: 1.6 M/S
ECHO AV PEAK VELOCITY: 1.6 M/S
ECHO AV PEAK VELOCITY: 1.8 M/S
ECHO AV PEAK VELOCITY: 1.8 M/S
ECHO AV VELOCITY RATIO: 0.67
ECHO AV VELOCITY RATIO: 0.67
ECHO AV VTI: 29.9 CM
ECHO AV VTI: 29.9 CM
ECHO BSA: 1.61 M2
ECHO LA DIAMETER INDEX: 2.35 CM/M2
ECHO LA DIAMETER: 3.8 CM
ECHO LA TO AORTIC ROOT RATIO: 1.12
ECHO LA TO AORTIC ROOT RATIO: 1.12
ECHO LA TO AORTIC ROOT RATIO: 1.36
ECHO LA TO AORTIC ROOT RATIO: 1.36
ECHO LA VOL A-L A2C: 60 ML (ref 22–52)
ECHO LA VOL A-L A2C: 60 ML (ref 22–52)
ECHO LA VOL A-L A2C: 90 ML (ref 22–52)
ECHO LA VOL A-L A2C: 90 ML (ref 22–52)
ECHO LA VOL A-L A4C: 45 ML (ref 22–52)
ECHO LA VOL A-L A4C: 45 ML (ref 22–52)
ECHO LA VOL A-L A4C: 72 ML (ref 22–52)
ECHO LA VOL A-L A4C: 72 ML (ref 22–52)
ECHO LA VOL BP: 77 ML (ref 22–52)
ECHO LA VOL BP: 77 ML (ref 22–52)
ECHO LA VOL MOD A2C: 52 ML (ref 22–52)
ECHO LA VOL MOD A2C: 52 ML (ref 22–52)
ECHO LA VOL MOD A2C: 86 ML (ref 22–52)
ECHO LA VOL MOD A2C: 86 ML (ref 22–52)
ECHO LA VOL MOD A4C: 43 ML (ref 22–52)
ECHO LA VOL MOD A4C: 43 ML (ref 22–52)
ECHO LA VOL MOD A4C: 68 ML (ref 22–52)
ECHO LA VOL MOD A4C: 68 ML (ref 22–52)
ECHO LA VOL/BSA BIPLANE: 48 ML/M2 (ref 16–34)
ECHO LA VOL/BSA BIPLANE: 48 ML/M2 (ref 16–34)
ECHO LA VOLUME AREA LENGTH: 56 ML
ECHO LA VOLUME AREA LENGTH: 56 ML
ECHO LA VOLUME AREA LENGTH: 81 ML
ECHO LA VOLUME AREA LENGTH: 81 ML
ECHO LA VOLUME INDEX A-L A2C: 37 ML/M2 (ref 16–34)
ECHO LA VOLUME INDEX A-L A2C: 37 ML/M2 (ref 16–34)
ECHO LA VOLUME INDEX A-L A2C: 56 ML/M2 (ref 16–34)
ECHO LA VOLUME INDEX A-L A2C: 56 ML/M2 (ref 16–34)
ECHO LA VOLUME INDEX A-L A4C: 28 ML/M2 (ref 16–34)
ECHO LA VOLUME INDEX A-L A4C: 28 ML/M2 (ref 16–34)
ECHO LA VOLUME INDEX A-L A4C: 44 ML/M2 (ref 16–34)
ECHO LA VOLUME INDEX A-L A4C: 44 ML/M2 (ref 16–34)
ECHO LA VOLUME INDEX AREA LENGTH: 35 ML/M2 (ref 16–34)
ECHO LA VOLUME INDEX AREA LENGTH: 35 ML/M2 (ref 16–34)
ECHO LA VOLUME INDEX AREA LENGTH: 50 ML/M2 (ref 16–34)
ECHO LA VOLUME INDEX AREA LENGTH: 50 ML/M2 (ref 16–34)
ECHO LA VOLUME INDEX MOD A2C: 32 ML/M2 (ref 16–34)
ECHO LA VOLUME INDEX MOD A2C: 32 ML/M2 (ref 16–34)
ECHO LA VOLUME INDEX MOD A2C: 53 ML/M2 (ref 16–34)
ECHO LA VOLUME INDEX MOD A2C: 53 ML/M2 (ref 16–34)
ECHO LA VOLUME INDEX MOD A4C: 27 ML/M2 (ref 16–34)
ECHO LA VOLUME INDEX MOD A4C: 27 ML/M2 (ref 16–34)
ECHO LA VOLUME INDEX MOD A4C: 42 ML/M2 (ref 16–34)
ECHO LA VOLUME INDEX MOD A4C: 42 ML/M2 (ref 16–34)
ECHO LV E' LATERAL VELOCITY: 10 CM/S
ECHO LV E' LATERAL VELOCITY: 10 CM/S
ECHO LV E' SEPTAL VELOCITY: 5 CM/S
ECHO LV E' SEPTAL VELOCITY: 5 CM/S
ECHO LV EDV A4C: 116 ML
ECHO LV EDV A4C: 116 ML
ECHO LV EDV A4C: 98 ML
ECHO LV EDV A4C: 98 ML
ECHO LV EDV INDEX A4C: 60 ML/M2
ECHO LV EDV INDEX A4C: 60 ML/M2
ECHO LV EDV INDEX A4C: 72 ML/M2
ECHO LV EDV INDEX A4C: 72 ML/M2
ECHO LV EF PHYSICIAN: 50 %
ECHO LV EJECTION FRACTION A4C: 41 %
ECHO LV EJECTION FRACTION A4C: 41 %
ECHO LV EJECTION FRACTION A4C: 51 %
ECHO LV EJECTION FRACTION A4C: 51 %
ECHO LV ESV A4C: 48 ML
ECHO LV ESV A4C: 48 ML
ECHO LV ESV A4C: 68 ML
ECHO LV ESV A4C: 68 ML
ECHO LV ESV INDEX A4C: 30 ML/M2
ECHO LV ESV INDEX A4C: 30 ML/M2
ECHO LV ESV INDEX A4C: 42 ML/M2
ECHO LV ESV INDEX A4C: 42 ML/M2
ECHO LV FRACTIONAL SHORTENING: 21 % (ref 28–44)
ECHO LV FRACTIONAL SHORTENING: 21 % (ref 28–44)
ECHO LV FRACTIONAL SHORTENING: 26 % (ref 28–44)
ECHO LV FRACTIONAL SHORTENING: 26 % (ref 28–44)
ECHO LV INTERNAL DIMENSION DIASTOLE INDEX: 2.9 CM/M2
ECHO LV INTERNAL DIMENSION DIASTOLE INDEX: 2.9 CM/M2
ECHO LV INTERNAL DIMENSION DIASTOLE INDEX: 2.96 CM/M2
ECHO LV INTERNAL DIMENSION DIASTOLE INDEX: 2.96 CM/M2
ECHO LV INTERNAL DIMENSION DIASTOLIC: 4.7 CM (ref 3.9–5.3)
ECHO LV INTERNAL DIMENSION DIASTOLIC: 4.7 CM (ref 3.9–5.3)
ECHO LV INTERNAL DIMENSION DIASTOLIC: 4.8 CM (ref 3.9–5.3)
ECHO LV INTERNAL DIMENSION DIASTOLIC: 4.8 CM (ref 3.9–5.3)
ECHO LV INTERNAL DIMENSION SYSTOLIC INDEX: 2.16 CM/M2
ECHO LV INTERNAL DIMENSION SYSTOLIC INDEX: 2.16 CM/M2
ECHO LV INTERNAL DIMENSION SYSTOLIC INDEX: 2.35 CM/M2
ECHO LV INTERNAL DIMENSION SYSTOLIC INDEX: 2.35 CM/M2
ECHO LV INTERNAL DIMENSION SYSTOLIC: 3.5 CM
ECHO LV INTERNAL DIMENSION SYSTOLIC: 3.5 CM
ECHO LV INTERNAL DIMENSION SYSTOLIC: 3.8 CM
ECHO LV INTERNAL DIMENSION SYSTOLIC: 3.8 CM
ECHO LV IVSD: 1.2 CM (ref 0.6–0.9)
ECHO LV MASS 2D: 199.6 G (ref 67–162)
ECHO LV MASS 2D: 199.6 G (ref 67–162)
ECHO LV MASS 2D: 232.2 G (ref 67–162)
ECHO LV MASS 2D: 232.2 G (ref 67–162)
ECHO LV MASS INDEX 2D: 123.2 G/M2 (ref 43–95)
ECHO LV MASS INDEX 2D: 123.2 G/M2 (ref 43–95)
ECHO LV MASS INDEX 2D: 143.4 G/M2 (ref 43–95)
ECHO LV MASS INDEX 2D: 143.4 G/M2 (ref 43–95)
ECHO LV POSTERIOR WALL DIASTOLIC: 1.1 CM (ref 0.6–0.9)
ECHO LV POSTERIOR WALL DIASTOLIC: 1.1 CM (ref 0.6–0.9)
ECHO LV POSTERIOR WALL DIASTOLIC: 1.3 CM (ref 0.6–0.9)
ECHO LV POSTERIOR WALL DIASTOLIC: 1.3 CM (ref 0.6–0.9)
ECHO LV RELATIVE WALL THICKNESS RATIO: 0.47
ECHO LV RELATIVE WALL THICKNESS RATIO: 0.47
ECHO LV RELATIVE WALL THICKNESS RATIO: 0.54
ECHO LV RELATIVE WALL THICKNESS RATIO: 0.54
ECHO LVOT AREA: 2.5 CM2
ECHO LVOT AREA: 2.5 CM2
ECHO LVOT AREA: 3.1 CM2
ECHO LVOT AREA: 3.1 CM2
ECHO LVOT AV VTI INDEX: 0.71
ECHO LVOT AV VTI INDEX: 0.71
ECHO LVOT DIAM: 1.8 CM
ECHO LVOT DIAM: 1.8 CM
ECHO LVOT DIAM: 2 CM
ECHO LVOT DIAM: 2 CM
ECHO LVOT MEAN GRADIENT: 3 MMHG
ECHO LVOT MEAN GRADIENT: 3 MMHG
ECHO LVOT PEAK GRADIENT: 6 MMHG
ECHO LVOT PEAK GRADIENT: 6 MMHG
ECHO LVOT PEAK VELOCITY: 1.2 M/S
ECHO LVOT PEAK VELOCITY: 1.2 M/S
ECHO LVOT STROKE VOLUME INDEX: 33.1 ML/M2
ECHO LVOT STROKE VOLUME INDEX: 33.1 ML/M2
ECHO LVOT SV: 53.7 ML
ECHO LVOT SV: 53.7 ML
ECHO LVOT VTI: 21.1 CM
ECHO LVOT VTI: 21.1 CM
ECHO MV A VELOCITY: 1.5 M/S
ECHO MV A VELOCITY: 1.5 M/S
ECHO MV E DECELERATION TIME (DT): 127.2 MS
ECHO MV E DECELERATION TIME (DT): 127.2 MS
ECHO MV E VELOCITY: 1.18 M/S
ECHO MV E VELOCITY: 1.18 M/S
ECHO MV E/A RATIO: 0.79
ECHO MV E/A RATIO: 0.79
ECHO MV E/E' LATERAL: 11.8
ECHO MV E/E' LATERAL: 11.8
ECHO MV E/E' RATIO (AVERAGED): 17.7
ECHO MV E/E' RATIO (AVERAGED): 17.7
ECHO MV E/E' SEPTAL: 23.6
ECHO MV E/E' SEPTAL: 23.6
ECHO MV EROA PISA: 0.1 CM2
ECHO MV EROA PISA: 0.1 CM2
ECHO MV REGURGITANT ALIASING (NYQUIST) VELOCITY: 31 CM/S
ECHO MV REGURGITANT ALIASING (NYQUIST) VELOCITY: 31 CM/S
ECHO MV REGURGITANT PEAK GRADIENT: 125 MMHG
ECHO MV REGURGITANT PEAK GRADIENT: 125 MMHG
ECHO MV REGURGITANT PEAK GRADIENT: 139 MMHG
ECHO MV REGURGITANT PEAK GRADIENT: 139 MMHG
ECHO MV REGURGITANT PEAK VELOCITY: 5.6 M/S
ECHO MV REGURGITANT PEAK VELOCITY: 5.6 M/S
ECHO MV REGURGITANT PEAK VELOCITY: 5.9 M/S
ECHO MV REGURGITANT PEAK VELOCITY: 5.9 M/S
ECHO MV REGURGITANT RADIUS PISA: 0.54 CM
ECHO MV REGURGITANT RADIUS PISA: 0.54 CM
ECHO MV REGURGITANT VELOCITY PISA: 5.3 M/S
ECHO MV REGURGITANT VELOCITY PISA: 5.3 M/S
ECHO MV REGURGITANT VOLUME PISA: 19.28 ML
ECHO MV REGURGITANT VOLUME PISA: 19.28 ML
ECHO MV REGURGITANT VTIA: 180 CM
ECHO MV REGURGITANT VTIA: 180 CM
ECHO PV MAX VELOCITY: 1.1 M/S
ECHO PV PEAK GRADIENT: 5 MMHG
ECHO RV INTERNAL DIMENSION: 2.4 CM
ECHO RV INTERNAL DIMENSION: 2.4 CM
ECHO RV INTERNAL DIMENSION: 2.9 CM
ECHO RV INTERNAL DIMENSION: 2.9 CM
ECHO RV TAPSE: 2.5 CM (ref 1.7–?)
ECHO RV TAPSE: 2.5 CM (ref 1.7–?)
EOSINOPHIL # BLD: 0 K/UL (ref 0–0.4)
EOSINOPHIL # BLD: 0 K/UL (ref 0–0.4)
EOSINOPHIL NFR BLD: 1 % (ref 0–7)
EOSINOPHIL NFR BLD: 1 % (ref 0–7)
ERYTHROCYTE [DISTWIDTH] IN BLOOD BY AUTOMATED COUNT: 25 % (ref 11.5–14.5)
ERYTHROCYTE [DISTWIDTH] IN BLOOD BY AUTOMATED COUNT: 25 % (ref 11.5–14.5)
GLUCOSE SERPL-MCNC: 95 MG/DL (ref 65–100)
GLUCOSE SERPL-MCNC: 95 MG/DL (ref 65–100)
HCT VFR BLD AUTO: 30.2 % (ref 35–47)
HCT VFR BLD AUTO: 30.2 % (ref 35–47)
HGB BLD-MCNC: 8.4 G/DL (ref 11.5–16)
HGB BLD-MCNC: 8.4 G/DL (ref 11.5–16)
IMM GRANULOCYTES # BLD AUTO: 0 K/UL (ref 0–0.04)
IMM GRANULOCYTES # BLD AUTO: 0 K/UL (ref 0–0.04)
IMM GRANULOCYTES NFR BLD AUTO: 1 % (ref 0–0.5)
IMM GRANULOCYTES NFR BLD AUTO: 1 % (ref 0–0.5)
LYMPHOCYTES # BLD: 1.2 K/UL (ref 0.8–3.5)
LYMPHOCYTES # BLD: 1.2 K/UL (ref 0.8–3.5)
LYMPHOCYTES NFR BLD: 24 % (ref 12–49)
LYMPHOCYTES NFR BLD: 24 % (ref 12–49)
MCH RBC QN AUTO: 19.4 PG (ref 26–34)
MCH RBC QN AUTO: 19.4 PG (ref 26–34)
MCHC RBC AUTO-ENTMCNC: 27.8 G/DL (ref 30–36.5)
MCHC RBC AUTO-ENTMCNC: 27.8 G/DL (ref 30–36.5)
MCV RBC AUTO: 69.9 FL (ref 80–99)
MCV RBC AUTO: 69.9 FL (ref 80–99)
MONOCYTES # BLD: 0.5 K/UL (ref 0–1)
MONOCYTES # BLD: 0.5 K/UL (ref 0–1)
MONOCYTES NFR BLD: 10 % (ref 5–13)
MONOCYTES NFR BLD: 10 % (ref 5–13)
NEUTS SEG # BLD: 3.1 K/UL (ref 1.8–8)
NEUTS SEG # BLD: 3.1 K/UL (ref 1.8–8)
NEUTS SEG NFR BLD: 64 % (ref 32–75)
NEUTS SEG NFR BLD: 64 % (ref 32–75)
NRBC # BLD: 0 K/UL (ref 0–0.01)
NRBC # BLD: 0 K/UL (ref 0–0.01)
NRBC BLD-RTO: 0 PER 100 WBC
NRBC BLD-RTO: 0 PER 100 WBC
PLATELET # BLD AUTO: 189 K/UL (ref 150–400)
PLATELET # BLD AUTO: 189 K/UL (ref 150–400)
POTASSIUM SERPL-SCNC: 4.1 MMOL/L (ref 3.5–5.1)
POTASSIUM SERPL-SCNC: 4.1 MMOL/L (ref 3.5–5.1)
PROT C PPP-ACNC: 74 % (ref 73–180)
PROT C PPP-ACNC: 74 % (ref 73–180)
PROT S ACT/NOR PPP: 34 % (ref 63–140)
PROT S ACT/NOR PPP: 34 % (ref 63–140)
RBC # BLD AUTO: 4.32 M/UL (ref 3.8–5.2)
RBC # BLD AUTO: 4.32 M/UL (ref 3.8–5.2)
RBC MORPH BLD: ABNORMAL
SODIUM SERPL-SCNC: 138 MMOL/L (ref 136–145)
SODIUM SERPL-SCNC: 138 MMOL/L (ref 136–145)
SPECIMEN EXP DATE BLD: NORMAL
SPECIMEN EXP DATE BLD: NORMAL
TROPONIN I SERPL HS-MCNC: 2239 NG/L (ref 0–51)
TROPONIN I SERPL HS-MCNC: 2239 NG/L (ref 0–51)
UNIT DIVISION: 0
UNIT ISSUE DATE/TIME: NORMAL
WBC # BLD AUTO: 4.8 K/UL (ref 3.6–11)
WBC # BLD AUTO: 4.8 K/UL (ref 3.6–11)

## 2024-08-27 PROCEDURE — 86850 RBC ANTIBODY SCREEN: CPT

## 2024-08-27 PROCEDURE — 6370000000 HC RX 637 (ALT 250 FOR IP): Performed by: STUDENT IN AN ORGANIZED HEALTH CARE EDUCATION/TRAINING PROGRAM

## 2024-08-27 PROCEDURE — 86921 COMPATIBILITY TEST INCUBATE: CPT

## 2024-08-27 PROCEDURE — 2580000003 HC RX 258: Performed by: NURSE PRACTITIONER

## 2024-08-27 PROCEDURE — 6360000002 HC RX W HCPCS: Performed by: INTERNAL MEDICINE

## 2024-08-27 PROCEDURE — 2580000003 HC RX 258: Performed by: INTERNAL MEDICINE

## 2024-08-27 PROCEDURE — 84484 ASSAY OF TROPONIN QUANT: CPT

## 2024-08-27 PROCEDURE — 80048 BASIC METABOLIC PNL TOTAL CA: CPT

## 2024-08-27 PROCEDURE — 36415 COLL VENOUS BLD VENIPUNCTURE: CPT

## 2024-08-27 PROCEDURE — 1100000003 HC PRIVATE W/ TELEMETRY

## 2024-08-27 PROCEDURE — 86900 BLOOD TYPING SEROLOGIC ABO: CPT

## 2024-08-27 PROCEDURE — 86901 BLOOD TYPING SEROLOGIC RH(D): CPT

## 2024-08-27 PROCEDURE — 99233 SBSQ HOSP IP/OBS HIGH 50: CPT

## 2024-08-27 PROCEDURE — 6360000002 HC RX W HCPCS: Performed by: NURSE PRACTITIONER

## 2024-08-27 PROCEDURE — 6370000000 HC RX 637 (ALT 250 FOR IP): Performed by: NURSE PRACTITIONER

## 2024-08-27 PROCEDURE — 85025 COMPLETE CBC W/AUTO DIFF WBC: CPT

## 2024-08-27 PROCEDURE — 86922 COMPATIBILITY TEST ANTIGLOB: CPT

## 2024-08-27 PROCEDURE — 86920 COMPATIBILITY TEST SPIN: CPT

## 2024-08-27 PROCEDURE — 93308 TTE F-UP OR LMTD: CPT

## 2024-08-27 PROCEDURE — 86880 COOMBS TEST DIRECT: CPT

## 2024-08-27 RX ORDER — METOPROLOL SUCCINATE 25 MG/1
12.5 TABLET, EXTENDED RELEASE ORAL DAILY
Status: DISCONTINUED | OUTPATIENT
Start: 2024-08-27 | End: 2024-08-29

## 2024-08-27 RX ADMIN — IRON SUCROSE 200 MG: 20 INJECTION, SOLUTION INTRAVENOUS at 13:13

## 2024-08-27 RX ADMIN — ATORVASTATIN CALCIUM 80 MG: 40 TABLET, FILM COATED ORAL at 20:21

## 2024-08-27 RX ADMIN — METOPROLOL SUCCINATE 12.5 MG: 25 TABLET, EXTENDED RELEASE ORAL at 18:52

## 2024-08-27 RX ADMIN — SODIUM CHLORIDE: 9 INJECTION, SOLUTION INTRAVENOUS at 10:28

## 2024-08-27 RX ADMIN — ASPIRIN 81 MG: 81 TABLET, CHEWABLE ORAL at 10:14

## 2024-08-27 RX ADMIN — PANTOPRAZOLE SODIUM 40 MG: 40 INJECTION, POWDER, FOR SOLUTION INTRAVENOUS at 03:54

## 2024-08-27 RX ADMIN — SODIUM CHLORIDE, PRESERVATIVE FREE 5 ML: 5 INJECTION INTRAVENOUS at 10:20

## 2024-08-27 RX ADMIN — PANTOPRAZOLE SODIUM 40 MG: 40 INJECTION, POWDER, FOR SOLUTION INTRAVENOUS at 14:54

## 2024-08-27 RX ADMIN — SODIUM CHLORIDE, PRESERVATIVE FREE 10 ML: 5 INJECTION INTRAVENOUS at 20:23

## 2024-08-27 RX ADMIN — CEFTRIAXONE SODIUM 1000 MG: 1 INJECTION, POWDER, FOR SOLUTION INTRAMUSCULAR; INTRAVENOUS at 10:35

## 2024-08-27 RX ADMIN — ACETAMINOPHEN 650 MG: 325 TABLET ORAL at 01:52

## 2024-08-27 ASSESSMENT — PAIN DESCRIPTION - LOCATION
LOCATION: ARM
LOCATION: ARM

## 2024-08-27 ASSESSMENT — PAIN DESCRIPTION - ORIENTATION
ORIENTATION: LEFT
ORIENTATION: LEFT

## 2024-08-27 ASSESSMENT — PAIN SCALES - GENERAL
PAINLEVEL_OUTOF10: 4
PAINLEVEL_OUTOF10: 0
PAINLEVEL_OUTOF10: 1
PAINLEVEL_OUTOF10: 1

## 2024-08-27 ASSESSMENT — PAIN DESCRIPTION - DESCRIPTORS
DESCRIPTORS: OTHER (COMMENT)
DESCRIPTORS: OTHER (COMMENT)

## 2024-08-27 NOTE — CARE COORDINATION
CM made attempt to contact pt at [phone] to complete CM initial  assessment  was unsuccessful. Phone was kept ringing. Floor Cm will follow up.    Prince Patel MSW    Ext [phone]

## 2024-08-27 NOTE — CARE COORDINATION
Care Management Initial Assessment       RUR:  OBS  Readmission? No  1st IM letter given? No  1st  letter given: No    Pt will be having OB/GYN surgery 8/28.     Plan Home with family.   Pt might be interested in OP Script for Speech as she felt that she was very helpful.   Appt prior to DC  Family can transport.     Pt lives with large family in two story home with 3 ANALISA.   No DME  No HH or rehab experience.   Was I with ADLs and drives.   Pharmacy: [facility] on [address].     Pt will probably transfer to [facility] tomorrow after procedure.     Advance Care Planning     General Advance Care Planning (ACP) Conversation    Date of Conversation: 8/27/2024  Conducted with: Patient with Decision Making Capacity  Other persons present: Spouse Jorge    Healthcare Decision Maker:: Spouse:Jorge Gomez: [phone]    Content/Action Overview:  DECLINED ACP Conversation - will revisit periodically  Reviewed DNR/DNI and patient elects Full Code (Attempt Resuscitation)    Length of Voluntary ACP Conversation in minutes:  <16 minutes (Non-Billable)    Tori Reagan  [id number]     08/27/24 1723   Service Assessment   Patient Orientation Alert and Oriented   Cognition Alert   History Provided By Patient;Child/Family;Significant Other   Primary Caregiver Family   Accompanied By/Relationship A Large Family   Support Systems Spouse/Significant Other;Children;Family Members;Friends/Neighbors   Patient's Healthcare Decision Maker is: Patient Declined (Legal Next of Kin Remains as Decision Maker)  (NOK: Spouse Jorge Gomez)   PCP Verified by CM No  (Pt stated her Dr retired and she would like a new PCP at [facility].)   Last Visit to PCP Within last two years   Prior Functional Level Independent in ADLs/IADLs   Current Functional Level Independent in ADLs/IADLs   Can patient return to prior living arrangement Yes   Ability to make needs known: Good   Family able to assist with home care needs: Yes   Would you like for me  to discuss the discharge plan with any other family members/significant others, and if so, who? Yes   Social/Functional History   Lives With Spouse;Daughter;Son   Type of Home House   Home Layout Two level   Home Access Stairs to enter with rails   Entrance Stairs - Number of Steps 3   Home Equipment None   Receives Help From Family   Active  Yes   Mode of Transportation Car   Discharge Planning   Type of Residence House   Living Arrangements Spouse/Significant Other;Children   Current Services Prior To Admission None   Potential Assistance Needed Other (Comment)  (Pt is interested in outpatient Speech if rec)   DME Ordered? No   Potential Assistance Purchasing Medications No   Type of Home Care Services None   Patient expects to be discharged to: House        Normal rate, regular rhythm, normal S1, S2 heart sounds heard.

## 2024-08-27 NOTE — CONSULTS
[facility]  [address] [place] [address]  [phone]  [email or web address]       CARDIOLOGY CONSULTATION       Date of  Admission: 8/24/2024  8:11 PM     Admission type:Emergency   Primary Care Physician:No primary care provider on file.     Attending Provider: Junior Quinonez MD  Cardiology Provider: None  CC/REASON FOR CONSULT: Preoperative cardiovascular assessment     Subjective:     Isabella Gomez is a 48 y.o. female admitted for Acute CVA (cerebrovascular accident) (HCC) [I63.9].    The patient was seen and examined at the bedside.  She reports no passing out episodes of shortness of breath.  She did experience an episode of chest discomfort around the front of the chest and also over the back on Friday/day of presentation but describes it to unaccustomed physical activity. Otherwise active physically at her baseline and was able to climb more than one flight of stairs.     Patient Active Problem List    Diagnosis Date Noted    Acute CVA (cerebrovascular accident) (HCC) 08/24/2024      No primary care provider on file.  History reviewed. No pertinent past medical history.   Social History     Socioeconomic History    Marital status:      Spouse name: None    Number of children: None    Years of education: None    Highest education level: None   Tobacco Use    Smoking status: Never    Smokeless tobacco: Never   Substance and Sexual Activity    Alcohol use: Not Currently    Drug use: Not Currently     Social Determinants of Health     Food Insecurity: No Food Insecurity (8/25/2024)    Hunger Vital Sign     Worried About Running Out of Food in the Last Year: Never true     Ran Out of Food in the Last Year: Never true   Transportation Needs: No Transportation Needs (8/25/2024)    PRAPARE - Transportation     Lack of Transportation (Medical): No     Lack of Transportation (Non-Medical): No   Housing Stability: Low Risk  (8/25/2024)    Housing  risk assessment    Patient initially presented with acute ischemic stroke that is thought to be cardioembolic/embolic stroke of undetermined source.  Workup for patent carranza ovale is negative by transthoracic study.  Recommend transesophageal echocardiogram with provocative maneuvers. Additionally, with get additional echo with echo contrast/Definity to check for left ventricle thrombus.     Recommend a stress test to check for large areas of coronary ischemia prior to  planned uterine surgery.     Cardiomyopathy: On repeat review of serial echocardiograms, left ventricular ejection fraction mildly reduced 45 to 50%, introduce low-dose beta-blocker.    Discussed with Dr. Shah with neurology team    Thank you for allowing us to participate in the care of this patient.  We will follow.      Vic Marc MD  CC:No primary care provider on file.

## 2024-08-27 NOTE — PLAN OF CARE
Problem: Discharge Planning  Goal: Discharge to home or other facility with appropriate resources  8/27/2024 1011 by Cindy Doe RN  Outcome: Progressing  8/27/2024 0249 by Vangie Stover RN  Outcome: Progressing  8/27/2024 0249 by Vangie Stover RN  Outcome: Progressing     Problem: Safety - Adult  Goal: Free from fall injury  8/27/2024 1011 by Cindy Doe RN  Outcome: Progressing  8/27/2024 0249 by Vangie Stover RN  Outcome: Progressing  8/27/2024 0249 by Vangie Stover RN  Outcome: Progressing     Problem: Skin/Tissue Integrity  Goal: Absence of new skin breakdown  Description: 1.  Monitor for areas of redness and/or skin breakdown  2.  Assess vascular access sites hourly  3.  Every 4-6 hours minimum:  Change oxygen saturation probe site  4.  Every 4-6 hours:  If on nasal continuous positive airway pressure, respiratory therapy assess nares and determine need for appliance change or resting period.  8/27/2024 1011 by Cindy Doe RN  Outcome: Progressing  8/27/2024 0249 by Vangie Stover RN  Outcome: Progressing  8/27/2024 0249 by Vangie Stover RN  Outcome: Progressing     Problem: Pain  Goal: Verbalizes/displays adequate comfort level or baseline comfort level  8/27/2024 1011 by Cindy Doe RN  Outcome: Progressing  8/27/2024 0249 by Vangie Stover RN  Outcome: Progressing  8/27/2024 0249 by Vangie Stover RN  Outcome: Progressing     Problem: Neurosensory - Adult  Goal: Achieves stable or improved neurological status  8/27/2024 1011 by Cindy Doe RN  Outcome: Progressing  8/27/2024 0249 by Vangie Stover RN  Outcome: Progressing  8/27/2024 0249 by Vangie Stover RN  Outcome: Progressing  Goal: Achieves maximal functionality and self care  8/27/2024 1011 by Cindy Doe RN  Outcome: Progressing  8/27/2024 0249 by Vangie Stover RN  Outcome: Progressing  8/27/2024 0249 by Vangie Stover, RN  Outcome: Progressing     Problem: Musculoskeletal - Adult  Goal:  Return mobility to safest level of function  8/27/2024 1011 by Cindy Doe RN  Outcome: Progressing  8/27/2024 0249 by Vangie Stover RN  Outcome: Progressing  8/27/2024 0249 by Vangie Stover RN  Outcome: Progressing  Goal: Return ADL status to a safe level of function  8/27/2024 1011 by Cindy Doe RN  Outcome: Progressing  8/27/2024 0249 by Vangie Stover RN  Outcome: Progressing  8/27/2024 0249 by Vangie Stover RN  Outcome: Progressing     Problem: Metabolic/Fluid and Electrolytes - Adult  Goal: Electrolytes maintained within normal limits  8/27/2024 1011 by Cindy Doe RN  Outcome: Progressing  8/27/2024 0249 by Vangie Stover RN  Outcome: Progressing  8/27/2024 0249 by Vangie Stover RN  Outcome: Progressing

## 2024-08-27 NOTE — PROGRESS NOTES
Speech pathology   Attempted to see patient for aphasia treatment; however, ultrasound technician setting up in room for testing. Will follow.     Elicia Santos M.S. BEATRICE-SLP

## 2024-08-27 NOTE — PROGRESS NOTES
Updated patient family , all questions answered   Plan for stress test and TERESA tomorrow   Will need to update GYN regarding above

## 2024-08-27 NOTE — PROGRESS NOTES
.End of Shift Note    Bedside shift change report given to ELVER Brito  (oncoming nurse) by Vangie Stover RN .        Shift worked:  7p - 7a   Shift summary and any significant changes:    No acute changes.     Concerns for physician to address: None   Zone phone for oncoming shift:  [phone]     Patient Information  Isabella Gomez  48 y.o.  8/24/2024  8:11 PM by Laine Michel MD. Isabella Gomez was admitted from [facility]    Problem List  Patient Active Problem List    Diagnosis Date Noted    Acute CVA (cerebrovascular accident) (HCC) 08/24/2024     History reviewed. No pertinent past medical history.    Core Measures:  CVA: yes  CHF: no  PNA: no    Activity:  Level of Assistance: Standby assist, set-up cues, supervision of patient - no hands on  Number times ambulated in hallways past shift: 1  Number of times OOB to chair past shift: 0    Cardiac:   Cardiac Monitoring: yes,     Access:   Current line(s): PIV    Respiratory:   O2 Device: None (Room air)    GI:  Last BM (including prior to admit): 08/26/24  Current diet:  ADULT DIET; Regular  Diet NPO  Tolerating current diet: Yes    Pain Management:   Patient states pain is manageable on current regimen: yes    Skin:  Ancelmo Scale Score: 20  Interventions: N/A  Pressure injury: no    Patient Safety:  Fall Score: Rosales Total Score: 20  Interventions: bed alarm  Self-release roll belt: No  Dexterity to release roll belt: no   (must document dexterity  here by stating Yes or No here, otherwise this is a restraint and must follow restraint documentation policy.)    DVT prophylaxis:  DVT prophylaxis: meds    Active Consults:  IP CONSULT TO TELE-NEUROLOGY  IP CONSULT TO NEUROLOGY  IP CONSULT TO NEUROLOGY  IP CONSULT TO OB GYN  IP CONSULT TO CARDIOLOGY  IP CONSULT TO SPIRITUAL SERVICES    Length of Stay:  Expected LOS: 7  Actual LOS: 3    Vangie Stover RN

## 2024-08-27 NOTE — PROGRESS NOTES
.End of Shift Note    Bedside shift change report given to ELVER Vences (oncoming nurse) by ELVER Brito and ELVER White.      Shift worked:  7a - 7p   Shift summary and any significant changes:    No acute changes. Troponin drawn today. Case management did initial assessment today. Lots of family at bedside today. Pt to be NPO at midnight for TERESA tomorrow.      Concerns for physician to address: None   Zone phone for oncoming shift:  [phone]     Patient Information  Isabella Gomez  48 y.o.  8/24/2024  8:11 PM by Laine Michel MD. Isabella Gomez was admitted from [facility]    Problem List  Patient Active Problem List    Diagnosis Date Noted    Acute CVA (cerebrovascular accident) (HCC) 08/24/2024     History reviewed. No pertinent past medical history.    Core Measures:  CVA: yes  CHF: no  PNA: no    Activity:  Level of Assistance: Minimal assist, patient does 75% or more  Number times ambulated in hallways past shift: 1  Number of times OOB to chair past shift: 0    Cardiac:   Cardiac Monitoring: yes,     Access:   Current line(s): PIV    Respiratory:   O2 Device: None (Room air)    GI:  Last BM (including prior to admit): 08/26/24  Current diet:  ADULT DIET; Regular  Diet NPO  Diet NPO Exceptions are: Ice Chips, Sips of Water with Meds  Tolerating current diet: Yes    Pain Management:   Patient states pain is manageable on current regimen: yes    Skin:  Ancelmo Scale Score: 20  Interventions: N/A  Pressure injury: no    Patient Safety:  Fall Score: Rosales Total Score: 20  Interventions: bed alarm  Self-release roll belt: No  Dexterity to release roll belt: no   (must document dexterity  here by stating Yes or No here, otherwise this is a restraint and must follow restraint documentation policy.)    DVT prophylaxis:  DVT prophylaxis: meds    Active Consults:  IP CONSULT TO TELE-NEUROLOGY  IP CONSULT TO NEUROLOGY  IP CONSULT TO NEUROLOGY  IP CONSULT TO OB GYN  IP CONSULT TO CARDIOLOGY  IP CONSULT TO CARDIOLOGY  IP CONSULT  TO SPIRITUAL SERVICES    Length of Stay:  Expected LOS: 7  Actual LOS: 3    Cindy Doe, RN

## 2024-08-27 NOTE — PROGRESS NOTES
HOSPITAL NEUROLOGY NOTE     Chief Complaint   Patient presents with    Aphasia     Difficulty finding words and following commands due to processing words used.         HPI  History excerpted from Dr. Shah's note on 8/25/2024  \"Isabella Gomez is a 48 y.o. female  with no significant past medical history presenting with acute onset of word finding difficulties, found to have a distal left M3 occlusion, received thrombolytic therapy, transferred to the ICU.     Patient is aphasic and has some difficulty providing history.  She states that she had no slurred speech only difficulty with her words.  She is still having persistent aphasia.  She has no other neurological symptoms.     She had a head CT which showed no acute intracranial process, CTA head and neck, CTP revealing for left M3 segment occlusion with delayed perfusion in the left posterior temporal/occipital lobe.  Discussion with neuroIR which did not recommend thrombectomy\".        INTERIM DATA: At the time of my evaluation this morning, patient was awake and alert.  Her exam today has slightly improved from yesterday. She was aphasic but was able to repeat sentence partially, recognize objects given to her by sounding the words out ( pen, elbow, and phone).  She was able to say her 's name. She followed commands in all extremities without any difficulty.       was at bedside. Patient and  verbalized patient is scheduled to have fibroid removal surgery tomorrow with BOYD ROMERO  A ten system review of constitutional, cardiovascular, respiratory, musculoskeletal, endocrine, skin, SHEENT, genitourinary, psychiatric and neurologic systems was obtained and is unremarkable except as stated in HPI     PMH  History reviewed. No pertinent past medical history.    ALLERGIES  No Known Allergies    PHYSICAL EXAMINATION:     General:   General appearance: Pt is in no acute distress   Distal pulses are preserved  Fundoscopic exam:  There is an area of delayed perfusion in the left posterior temporal/occipital lobe, correlating with the left M3 segment occlusion.    Repeat head CT without contrast on 8/25/2024: Moderate sized infarction left temporoparietal lobe. No superimposed hemorrhage or midline shift.    MRI Brain without contrast completed on 8/25/2024: Moderate-sized acute cortical infarction left frontotemporal with punctate foci of infarction at the right and left parietal lobes and right frontal lobe as  well. There is no superimposed hemorrhage, midline shift or mass effect. No additional acute intracranial process is demonstrated.    TTE completed on 8/25/2024: Low normal left ventricular systolic function with a visually estimated EF of 50-55%.  Mild increased wall thickness.  No mention of PFO or thrombus.    Repeat TTE: pending    Protein S activity: Pending    Protein C activity: Pending    Factor V Leiden: Pending    Antithrombin panel: Pending    Antiphospholipid syndrome panel: Pending    Stroke labs:  HgBA1c    Hemoglobin A1C   Date Value Ref Range Status   08/25/2024 <3.8 (L) 4.0 - 5.6 % Final     Comment:     RESULTS REPEATED  (NOTE)  HbA1C Interpretive Ranges  <5.7              Normal  5.7 - 6.4         Consider Prediabetes  >6.5              Consider Diabetes          LDL   Lab Results   Component Value Date    CHOL 83 08/25/2024    TRIG 94 08/25/2024    HDL 30 08/25/2024    LDL 34.2 08/25/2024    VLDL 18.8 08/25/2024    CHOLHDLRATIO 2.8 08/25/2024          IMPRESSION:  Isabella Gomez is a 48 y.o. female who presents with distal left M3 occlusion with perfusion deficit, now with evolving left MCA territory effect on repeat head CT, in the setting of acute onset of aphasia.  Her current neurological examination reveals no focal sensory or motor deficits and aphasia has improved.    - Head CT without contrast showed no acute intracranial process.  There is no intracranial mass or hemorrhage.    - CTA head and neck  If TTE comes back normal, will recommend to obtain TERESA to rule out any intracardiac thrombus.  - Hypercoagulable panel: pending   - Telemetry  - Antiplatelets: DAPT is not indicated at this time given her strokes a few cardioembolic.  She is on 81 mg aspirin daily.  - Hypertension: non aggressive blood pressure control for the first 24 hours then Aggressive control with goal  blood pressure less than 140/90  - Dyslipidemia: LDL 34.2 at goal.  Goal LDL is less than 70.  Start Statins if LDL more than 70. - Please check hepatic panel prior to initiation of statins  - Diabetes: Hemoglobin A1c <3.8.  At goal  - PT/OT/Speech    I provided stroke education today in regards to risk factors for stroke and lifestyle modifications to help minimize the risk of future stroke.  This included medication compliance, regular follow up with primary care physician,  and healthy lifestyle habits (nutrition/exercise).    Neurology will continue to follow closely in this complicated patient with ongoing neurological symptoms necessitating additional testing. Updated orders placed.  Care plan discussed with primary team.     Thank you very much for this consultation.    I spent 48 minutes providing care to this acutely ill inpatient with > 50% of the time counseling as well as reviewing the patient's chart, notes, labs, medications and preparing documentation along with assisting in the coordination of care of the patient on the patient's hospital floor/unit.     SHAVON Lomeli - NP     Collaborating physician:   Dr. Angelika Shah

## 2024-08-27 NOTE — PROGRESS NOTES
Hospitalist Progress Note    NAME:   Isabella Gomez   : 1975   MRN: [id number]     Date/Time: 2024 3:37 PM  Patient PCP: No primary care provider on file.    Estimated discharge date:   Barriers: GYN cl, Neuro Cl    ICU transfer:   Linnea Gomez is a 47 yo female with a PMH of PAOLO (not on supplementation) who presented to the ED with acute onset expressive aphasia that began 45 min prior to ED arrival.  She was given TNK in the ED. CT head and neck showed L M3 segment occlusion with correlating perfusion defect.  Transferred out ICU 2024     Assessment / Plan:  Moderate-sized acute cortical infarction left frontotemporal with punctate foci of infarction at the right and left parietal lobes and right frontal lobe   Acute onset expressive aphasia   CT head / neck noted L M3 segment occlusion   MRI 2024  TNK in ED - admitted to ICU   FINDINGS:   There is a moderate sized confluent cortical infarction temporoparietal lobe.  Punctate foci of infarction at the right and left frontal lobes. There is no superimposed hemorrhage, midline shift or mass effect.  Neurology following   Stroke work up   Excerpt : Head CT without contrast completed on 2024: No acute intracranial process.  There is no intracranial mass or hemorrhage.     CTA head and neck completed on 2024: Left M3 segment occlusion, with correlating perfusion defect. No evidence for intracranial aneurysm or hemodynamically significant stenosis.     CT brain perfusion completed on 2024: There is an area of delayed perfusion in the left posterior temporal/occipital lobe, correlating with the left M3 segment occlusion.     Repeat head CT without contrast on 2024: Moderate sized infarction left temporoparietal lobe. No superimposed hemorrhage or midline shift.     MRI Brain without contrast completed on 2024: Moderate-sized acute cortical infarction left frontotemporal with punctate foci of infarction at  bleeding due to monophasic exam. No intraabdominal bleeding.     -Plan for myomectomy 8/28/2024  -NPO after Midnight Tuesday   Patient will need cardiac preop clearance    Troponinemia  Troponin was significantly elevated on admission above 5000, trended down to 4700  We will check another troponin  Cardiology consulted  Repeat 2D echo pending.  2D echo showed diastolic dysfunction, EF of 55%  Hypokalemia  Resolved        Medical Decision Making:   I personally reviewed labs:y bmp, cbc   I personally reviewed imaging: MRI of the brain showed moderate stroke  I personally reviewed EKG:y  Toxic drug monitoring: Monitor for bleeding while on baby aspirin  Discussed case with: GYN, neurology and cardiology (see above discussion    Daily patient family at bedside    Code Status: full code   DVT Prophylaxis: Aspirin  GI Prophylaxis: protonix     Subjective:     Chief Complaint / Reason for Physician Visit  \"Follow-up CVA status post TNK  Patient reported feeling better, symptoms resolved  Speech clear  Objective:     VITALS:   Last 24hrs VS reviewed since prior progress note. Most recent are:  Patient Vitals for the past 24 hrs:   BP Temp Temp src Pulse Resp SpO2   08/27/24 1130 131/70 98.2 °F (36.8 °C) Oral 97 18 99 %   08/27/24 0815 136/67 98.1 °F (36.7 °C) Oral 89 18 99 %   08/27/24 0254 (!) 115/58 97.5 °F (36.4 °C) -- 94 20 100 %   08/26/24 2332 (!) 113/58 98.4 °F (36.9 °C) -- 89 20 100 %   08/26/24 2022 (!) 143/79 98.4 °F (36.9 °C) Oral (!) 116 20 100 %       No intake or output data in the 24 hours ending 08/27/24 1537       I had a face to face encounter and independently examined this patient on 8/27/2024, as outlined below:  PHYSICAL EXAM:  General: Pleasant cooperative Alert, cooperative, social   EENT:  EOMI. Anicteric sclerae.  Resp:  CTA bilaterally, no wheezing or rales.  No accessory muscle use  CV:  Regular  rhythm,  No edema  GI:  Soft, Non distended, Non tender.  +Bowel sounds  Neurologic:  Alert and

## 2024-08-27 NOTE — PROGRESS NOTES
Gynecology Progress Note    Isabella Gomze    Assesment: Prolapsing fibroid unclear width of stalk.    Plan: Ethics called yesterday and approved Mirena IUD if needed. Endoloops available.Discussed yesterday with Guilherme Castro and David. Explained to patient in general terms proposed surgery and possibilities. Dr Hernandez to provide further details tomorrow AM as he will be the surgeon. NPO after MN..    She is without significant complaints. Pain controlled on current medication.  Voiding without difficulty. No vaginal bleeding.      Vitals:  BP (!) 115/58   Pulse 94   Temp 97.5 °F (36.4 °C)   Resp 20   Ht 1.651 m (5' 5\")   Wt 56.7 kg (125 lb)   SpO2 100%   BMI 20.80 kg/m²   Temp (24hrs), Av.2 °F (36.8 °C), Min:97.5 °F (36.4 °C), Max:98.4 °F (36.9 °C)      Last 24hr Input/Output:    Intake/Output Summary (Last 24 hours) at 2024 0706  Last data filed at 2024 1343  Gross per 24 hour   Intake 385.41 ml   Output --   Net 385.41 ml          Exam:  General: alert, appears stated age, and cooperative     Abdomen: abdomen is soft without significant tenderness, masses, organomegaly or guarding     Extremities: extremities normal, atraumatic, no cyanosis or edema      Labs:   Lab Results   Component Value Date/Time    WBC 4.8 2024 01:59 AM    WBC 5.0 2024 03:13 AM    WBC 4.9 2024 04:18 AM    WBC 5.5 2024 10:46 PM    WBC 5.3 2024 08:59 PM    WBC 5.4 2024 08:19 PM    HGB 8.4 2024 01:59 AM    HGB 8.5 2024 01:28 PM    HGB 6.9 2024 03:13 AM    HGB 5.6 2024 04:18 AM    HGB 5.6 2024 10:46 PM    HGB 5.3 2024 08:59 PM    HGB 5.9 2024 08:19 PM    HCT 30.2 2024 01:59 AM    HCT 30.6 2024 01:28 PM    HCT 25.2 2024 03:13 AM    HCT 22.1 2024 04:18 AM    HCT 21.9 2024 10:46 PM    HCT 20.8 2024 08:59 PM    HCT 23.2 2024 08:19 PM     2024 01:59 AM     2024 03:13 AM    PLT

## 2024-08-28 ENCOUNTER — APPOINTMENT (OUTPATIENT)
Facility: HOSPITAL | Age: 49
End: 2024-08-28
Attending: STUDENT IN AN ORGANIZED HEALTH CARE EDUCATION/TRAINING PROGRAM
Payer: COMMERCIAL

## 2024-08-28 ENCOUNTER — APPOINTMENT (OUTPATIENT)
Facility: HOSPITAL | Age: 49
End: 2024-08-28
Payer: COMMERCIAL

## 2024-08-28 LAB
BACTERIA SPEC CULT: ABNORMAL
BACTERIA SPEC CULT: ABNORMAL
CC UR VC: ABNORMAL
CC UR VC: ABNORMAL
SERVICE CMNT-IMP: ABNORMAL
SERVICE CMNT-IMP: ABNORMAL

## 2024-08-28 PROCEDURE — 6360000002 HC RX W HCPCS: Performed by: NURSE PRACTITIONER

## 2024-08-28 PROCEDURE — 6360000002 HC RX W HCPCS: Performed by: INTERNAL MEDICINE

## 2024-08-28 PROCEDURE — 2580000003 HC RX 258: Performed by: NURSE PRACTITIONER

## 2024-08-28 PROCEDURE — 6370000000 HC RX 637 (ALT 250 FOR IP): Performed by: STUDENT IN AN ORGANIZED HEALTH CARE EDUCATION/TRAINING PROGRAM

## 2024-08-28 PROCEDURE — 99153 MOD SED SAME PHYS/QHP EA: CPT

## 2024-08-28 PROCEDURE — 1100000003 HC PRIVATE W/ TELEMETRY

## 2024-08-28 PROCEDURE — 99152 MOD SED SAME PHYS/QHP 5/>YRS: CPT

## 2024-08-28 PROCEDURE — 6360000002 HC RX W HCPCS: Performed by: STUDENT IN AN ORGANIZED HEALTH CARE EDUCATION/TRAINING PROGRAM

## 2024-08-28 PROCEDURE — C8925 2D TEE W OR W/O FOL W/CON,IN: HCPCS

## 2024-08-28 PROCEDURE — 6370000000 HC RX 637 (ALT 250 FOR IP): Performed by: NURSE PRACTITIONER

## 2024-08-28 PROCEDURE — C8924 2D TTE W OR W/O FOL W/CON,FU: HCPCS

## 2024-08-28 PROCEDURE — 92507 TX SP LANG VOICE COMM INDIV: CPT

## 2024-08-28 PROCEDURE — 99233 SBSQ HOSP IP/OBS HIGH 50: CPT

## 2024-08-28 PROCEDURE — 6360000004 HC RX CONTRAST MEDICATION: Performed by: INTERNAL MEDICINE

## 2024-08-28 PROCEDURE — 2580000003 HC RX 258: Performed by: INTERNAL MEDICINE

## 2024-08-28 RX ORDER — MIDAZOLAM HYDROCHLORIDE 1 MG/ML
INJECTION INTRAMUSCULAR; INTRAVENOUS PRN
Status: COMPLETED | OUTPATIENT
Start: 2024-08-28 | End: 2024-08-28

## 2024-08-28 RX ORDER — LIDOCAINE HYDROCHLORIDE 20 MG/ML
SOLUTION OROPHARYNGEAL PRN
Status: COMPLETED | OUTPATIENT
Start: 2024-08-28 | End: 2024-08-28

## 2024-08-28 RX ORDER — LIDOCAINE 40 MG/G
CREAM TOPICAL ONCE AS NEEDED
Status: DISCONTINUED | OUTPATIENT
Start: 2024-08-28 | End: 2024-08-28

## 2024-08-28 RX ORDER — LIDOCAINE HYDROCHLORIDE 20 MG/ML
15 SOLUTION OROPHARYNGEAL
Status: DISCONTINUED | OUTPATIENT
Start: 2024-08-28 | End: 2024-08-28

## 2024-08-28 RX ORDER — FENTANYL CITRATE 50 UG/ML
INJECTION, SOLUTION INTRAMUSCULAR; INTRAVENOUS PRN
Status: COMPLETED | OUTPATIENT
Start: 2024-08-28 | End: 2024-08-28

## 2024-08-28 RX ADMIN — FENTANYL CITRATE 25 MCG: 50 INJECTION, SOLUTION INTRAMUSCULAR; INTRAVENOUS at 15:21

## 2024-08-28 RX ADMIN — SODIUM CHLORIDE, PRESERVATIVE FREE 10 ML: 5 INJECTION INTRAVENOUS at 23:09

## 2024-08-28 RX ADMIN — IRON SUCROSE 200 MG: 20 INJECTION, SOLUTION INTRAVENOUS at 13:14

## 2024-08-28 RX ADMIN — LIDOCAINE HYDROCHLORIDE 15 ML: 20 SOLUTION ORAL at 15:14

## 2024-08-28 RX ADMIN — METOPROLOL SUCCINATE 12.5 MG: 25 TABLET, EXTENDED RELEASE ORAL at 09:31

## 2024-08-28 RX ADMIN — BENZOCAINE, BUTAMBEN, AND TETRACAINE HYDROCHLORIDE 2 SPRAY: .028; .004; .004 AEROSOL, SPRAY TOPICAL at 15:14

## 2024-08-28 RX ADMIN — FENTANYL CITRATE 25 MCG: 50 INJECTION, SOLUTION INTRAMUSCULAR; INTRAVENOUS at 15:25

## 2024-08-28 RX ADMIN — MIDAZOLAM HYDROCHLORIDE 1 MG: 1 INJECTION, SOLUTION INTRAMUSCULAR; INTRAVENOUS at 15:25

## 2024-08-28 RX ADMIN — MIDAZOLAM HYDROCHLORIDE 1 MG: 1 INJECTION, SOLUTION INTRAMUSCULAR; INTRAVENOUS at 15:19

## 2024-08-28 RX ADMIN — FENTANYL CITRATE 50 MCG: 50 INJECTION, SOLUTION INTRAMUSCULAR; INTRAVENOUS at 15:17

## 2024-08-28 RX ADMIN — MIDAZOLAM HYDROCHLORIDE 1 MG: 1 INJECTION, SOLUTION INTRAMUSCULAR; INTRAVENOUS at 15:17

## 2024-08-28 RX ADMIN — MIDAZOLAM HYDROCHLORIDE 1 MG: 1 INJECTION, SOLUTION INTRAMUSCULAR; INTRAVENOUS at 15:21

## 2024-08-28 RX ADMIN — MIDAZOLAM HYDROCHLORIDE 2 MG: 1 INJECTION, SOLUTION INTRAMUSCULAR; INTRAVENOUS at 15:36

## 2024-08-28 RX ADMIN — SODIUM CHLORIDE, PRESERVATIVE FREE 10 ML: 5 INJECTION INTRAVENOUS at 09:30

## 2024-08-28 RX ADMIN — ATORVASTATIN CALCIUM 80 MG: 40 TABLET, FILM COATED ORAL at 23:09

## 2024-08-28 RX ADMIN — PANTOPRAZOLE SODIUM 40 MG: 40 INJECTION, POWDER, FOR SOLUTION INTRAVENOUS at 16:48

## 2024-08-28 RX ADMIN — PERFLUTREN 1.5 ML: 6.52 INJECTION, SUSPENSION INTRAVENOUS at 15:58

## 2024-08-28 RX ADMIN — PANTOPRAZOLE SODIUM 40 MG: 40 INJECTION, POWDER, FOR SOLUTION INTRAVENOUS at 03:44

## 2024-08-28 RX ADMIN — CEFTRIAXONE SODIUM 1000 MG: 1 INJECTION, POWDER, FOR SOLUTION INTRAMUSCULAR; INTRAVENOUS at 09:30

## 2024-08-28 NOTE — PROGRESS NOTES
GYN    Understand patient to undergo stress test and TERESA this morning with GYN surgery to follow this afternoon pending results.  Pt has been NPO since MN.  2 units of cross matched blood are available (expiration 8/30).

## 2024-08-28 NOTE — PERIOP NOTE
10:35= called to receive report from Charlette, Primary RN, who stated that Dr. Hernandez is currently in room speaking with pt and family; surgery may be postponed until tomorrow d/t having TERESA and cardiac testing today. Informed Elicia, OR Charge.

## 2024-08-28 NOTE — PROGRESS NOTES
.End of Shift Note    Bedside shift change report given to ELVER Sauceda  (oncoming nurse) by Sheri Parks RN .        Shift worked:  days   Shift summary and any significant changes:    No acute changes.    Patient NPO at midnight.  Procedure in the AM.    - Stress test  Pending OB surgery on Friday      Concerns for physician to address: None   Zone phone for oncoming shift:  [phone]     Patient Information  Isabella Gomez  48 y.o.  8/24/2024  8:11 PM by Laine Michel MD. Isabella Gomez was admitted from [facility]    Problem List  Patient Active Problem List    Diagnosis Date Noted    Acute CVA (cerebrovascular accident) (HCC) 08/24/2024     History reviewed. No pertinent past medical history.    Core Measures:  CVA: yes  CHF: no  PNA: no    Activity:  Level of Assistance: Minimal assist, patient does 75% or more  Number times ambulated in hallways past shift: 1  Number of times OOB to chair past shift: 0    Cardiac:   Cardiac Monitoring: yes,     Access:   Current line(s): PIV    Respiratory:   O2 Device: None (Room air)    GI:  Last BM (including prior to admit): 08/26/24  Current diet:  Diet NPO  ADULT DIET; Regular  DIET ONE TIME MESSAGE;  Tolerating current diet: Yes    Pain Management:   Patient states pain is manageable on current regimen: yes    Skin:  Ancelmo Scale Score: 20  Interventions: N/A  Pressure injury: no    Patient Safety:  Fall Score: Rosales Total Score: 20  Interventions: bed alarm  Self-release roll belt: No  Dexterity to release roll belt: no   (must document dexterity  here by stating Yes or No here, otherwise this is a restraint and must follow restraint documentation policy.)    DVT prophylaxis:  DVT prophylaxis: meds    Active Consults:  IP CONSULT TO TELE-NEUROLOGY  IP CONSULT TO NEUROLOGY  IP CONSULT TO NEUROLOGY  IP CONSULT TO OB GYN  IP CONSULT TO CARDIOLOGY  IP CONSULT TO CARDIOLOGY    Length of Stay:  Expected LOS: 7  Actual LOS: 4    Sheri Parks

## 2024-08-28 NOTE — CARE COORDINATION
Transition of Care Plan:    RUR: 10% (low RUR)   Prior Level of Functioning: Independent  Disposition: Home with outpatient SLP   If SNF or IPR: Date FOC offered:   Date FOC received:   Accepting facility:   Date authorization started with reference number:   Date authorization received and expires:   Follow up appointments: PCP/Specialists as indicated  DME needed: None at this time  Transportation at discharge: Family to transport  IM/IMM Medicare/ letter given: N/a - Commercial   Is patient a [place] and connected with [facility]? N/a   If yes, was  transfer form completed and VA notified? N/a  Caregiver Contact:  Spouse:Jorge Penaessence: [phone]  Discharge Caregiver contacted prior to discharge? Pt to contact  Care Conference needed? Not at this time  Barriers to discharge: Medical clearance    0924 - Assumed transitions of care planning from AMY Gomez. Chart reviewed. Plan to d/c home with outpatient SLP, family to transport. CM following for additional needs.     1413 - Noted cards testing today and tomorrow with possible GYN intervention 08/30. RANDY updated.     ALAYNA Weaver  Care Management  [facility]  [phone]

## 2024-08-28 NOTE — PROGRESS NOTES
[facility]  [address] [place] [address] [phone]  [email or web address]  CARDIOLOGY PROGRESS NOTE    8/28/2024 12:15 PM    Admit Date: 8/24/2024    Admit Diagnosis:   Acute CVA (cerebrovascular accident) (HCC) [I63.9]    Subjective:     Isabella Gomez was seen at the bedside.  Does not voice any complaints.    /71   Pulse 96   Temp 98.6 °F (37 °C) (Oral)   Resp 18   Ht 1.651 m (5' 5\")   Wt 56.7 kg (125 lb)   SpO2 100%   BMI 20.80 kg/m²     Current Facility-Administered Medications   Medication Dose Route Frequency    iron sucrose (VENOFER) injection 200 mg  200 mg IntraVENous Q24H    metoprolol succinate (TOPROL XL) extended release tablet 12.5 mg  12.5 mg Oral Daily    pantoprazole (PROTONIX) 40 mg in sodium chloride (PF) 0.9 % 10 mL injection  40 mg IntraVENous Q12H    calcium carbonate (TUMS) chewable tablet 500 mg  500 mg Oral TID PRN    0.9 % sodium chloride infusion   IntraVENous PRN    cefTRIAXone (ROCEPHIN) 1,000 mg in sodium chloride 0.9 % 50 mL IVPB (mini-bag)  1,000 mg IntraVENous Q24H    lidocaine 4 % external patch 2 patch  2 patch TransDERmal Daily    dextrose bolus 10% 125 mL  125 mL IntraVENous PRN    sodium chloride flush 0.9 % injection 5-40 mL  5-40 mL IntraVENous 2 times per day    sodium chloride flush 0.9 % injection 5-40 mL  5-40 mL IntraVENous PRN    0.9 % sodium chloride infusion   IntraVENous PRN    potassium chloride 20 mEq/50 mL IVPB (Central Line)  20 mEq IntraVENous PRN    Or    potassium chloride 10 mEq/100 mL IVPB (Peripheral Line)  10 mEq IntraVENous PRN    magnesium sulfate 2000 mg in 50 mL IVPB premix  2,000 mg IntraVENous PRN    acetaminophen (TYLENOL) tablet 650 mg  650 mg Oral Q6H PRN    Or    acetaminophen (TYLENOL) suppository 650 mg  650 mg Rectal Q6H PRN    ondansetron (ZOFRAN-ODT) disintegrating tablet 4 mg  4 mg Oral Q8H PRN    Or    ondansetron (ZOFRAN) injection 4 mg  4 mg IntraVENous Q6H PRN

## 2024-08-28 NOTE — PROGRESS NOTES
.End of Shift Note    Bedside shift change report given to ELVER Wade  (oncoming nurse) by Vangie Stover RN .        Shift worked:  7p - 7a   Shift summary and any significant changes:    No acute changes.  Patient NPO since midnight.  Procedure in the AM.  Cross [facility] Type Lab work completed     Concerns for physician to address: None   Zone phone for oncoming shift:  [phone]     Patient Information  Isabella Gomez  48 y.o.  8/24/2024  8:11 PM by Laine Michel MD. Isabella Gomez was admitted from [facility]    Problem List  Patient Active Problem List    Diagnosis Date Noted    Acute CVA (cerebrovascular accident) (HCC) 08/24/2024     History reviewed. No pertinent past medical history.    Core Measures:  CVA: yes  CHF: no  PNA: no    Activity:  Level of Assistance: Minimal assist, patient does 75% or more  Number times ambulated in hallways past shift: 1  Number of times OOB to chair past shift: 0    Cardiac:   Cardiac Monitoring: yes,     Access:   Current line(s): PIV    Respiratory:   O2 Device: None (Room air)    GI:  Last BM (including prior to admit): 08/26/24  Current diet:  Diet NPO Exceptions are: Ice Chips, Sips of Water with Meds  Tolerating current diet: Yes    Pain Management:   Patient states pain is manageable on current regimen: yes    Skin:  Ancelmo Scale Score: 21  Interventions: N/A  Pressure injury: no    Patient Safety:  Fall Score: Rosales Total Score: 20  Interventions: bed alarm  Self-release roll belt: No  Dexterity to release roll belt: no   (must document dexterity  here by stating Yes or No here, otherwise this is a restraint and must follow restraint documentation policy.)    DVT prophylaxis:  DVT prophylaxis: meds    Active Consults:  IP CONSULT TO TELE-NEUROLOGY  IP CONSULT TO NEUROLOGY  IP CONSULT TO NEUROLOGY  IP CONSULT TO OB GYN  IP CONSULT TO CARDIOLOGY  IP CONSULT TO CARDIOLOGY  IP CONSULT TO SPIRITUAL SERVICES    Length of Stay:  Expected LOS: 7  Actual LOS: 4    Vangie Stover

## 2024-08-28 NOTE — PLAN OF CARE
Speech LAnguage Pathology TREATMENT    Patient: Isabella Gomez (48 y.o. female)  Date: 8/28/2024  Primary Diagnosis: Acute CVA (cerebrovascular accident) (HCC) [I63.9]  Procedure(s) (LRB):  MYOMECTOMY, POSSIBLE ABDOMINAL HYSTERECTOMY (N/A) Day of Surgery   Precautions:                     ASSESSMENT :  Patient continues to present with expressive language deficits characterized by phonemic paraphasias, word finding difficulty, and difficulty with repetition. Mild improvement from initial evaluation observed. Patient continues to benefit from written and visual cues. Noted multi-syllable words with increased difficulty despite written cues. Attempted tapping though limited improvement noted. SLP reviewed word finding strategies, energy allocation model, and recommendations for independent practice. Patient continues to benefit from SLP services upon d/c. SLP will follow while in house.     Patient will benefit from skilled intervention to address the above impairments.     PLAN :  Recommendations and Planned Interventions:  --Ask yes/no questions   --Eliminate distractions   --Anticipate patient's wants/needs   --Cue word finding strategies   --Utilize written/visual aids        Recommend next SLP session:  continued diagnostic speech-language treatment and patient/family/staff education     Acute SLP Services: Yes, SLP will continue to follow per plan of care.  Discharge Recommendations: Yes, recommend SLP treatment at next level of care (Outpatient or Home health services)      SUBJECTIVE:   Patient stated, “It's better.”    OBJECTIVE:   History reviewed. No pertinent past medical history.No past surgical history on file.  Prior Level of Function/Home Situation:   Social/Functional History  Lives With: Spouse, Daughter, Son  Type of Home: House  Home Layout: Two level  Home Access: Stairs to enter with rails  Entrance Stairs - Number of Steps: 3  Entrance Stairs - Rails: Both  Bathroom Shower/Tub: Tub/Shower

## 2024-08-28 NOTE — PROGRESS NOTES
Pt sedated with 6 mg Versed and 100 mcg Fentanyl for TERESA (monitored sedation from 1516 to 1544)

## 2024-08-28 NOTE — PLAN OF CARE
Problem: Discharge Planning  Goal: Discharge to home or other facility with appropriate resources  Outcome: Progressing     Problem: Safety - Adult  Goal: Free from fall injury  Outcome: Progressing     Problem: Skin/Tissue Integrity  Goal: Absence of new skin breakdown  Description: 1.  Monitor for areas of redness and/or skin breakdown  2.  Assess vascular access sites hourly  3.  Every 4-6 hours minimum:  Change oxygen saturation probe site  4.  Every 4-6 hours:  If on nasal continuous positive airway pressure, respiratory therapy assess nares and determine need for appliance change or resting period.  Outcome: Progressing     Problem: Pain  Goal: Verbalizes/displays adequate comfort level or baseline comfort level  Outcome: Progressing     Problem: Neurosensory - Adult  Goal: Achieves stable or improved neurological status  Outcome: Progressing  Goal: Achieves maximal functionality and self care  Outcome: Progressing     Problem: Musculoskeletal - Adult  Goal: Return mobility to safest level of function  Outcome: Progressing  Goal: Return ADL status to a safe level of function  Outcome: Progressing     Problem: Metabolic/Fluid and Electrolytes - Adult  Goal: Electrolytes maintained within normal limits  Outcome: Progressing

## 2024-08-28 NOTE — PROGRESS NOTES
HOSPITAL NEUROLOGY NOTE     Chief Complaint   Patient presents with    Aphasia     Difficulty finding words and following commands due to processing words used.         [facility] [place]  History excerpted from Dr. Shah's note on 8/25/2024  \"Isabella Gomez is a 48 y.o. female  with no significant past medical history presenting with acute onset of word finding difficulties, found to have a distal left M3 occlusion, received thrombolytic therapy, transferred to the ICU.     Patient is aphasic and has some difficulty providing history.  She states that she had no slurred speech only difficulty with her words.  She is still having persistent aphasia.  She has no other neurological symptoms.     She had a head CT which showed no acute intracranial process, CTA head and neck, CTP revealing for left M3 segment occlusion with delayed perfusion in the left posterior temporal/occipital lobe.  Discussion with neuroIR which did not recommend thrombectomy\".        INTERIM DATA: At the time of my evaluation this morning, patient was awake and alert and oriented to place, year but did have difficulty with the month.  Her exam today has slightly improved from yesterday. She was aphasic but was able to repeat sentence (today is a beautiful day) without difficulty. Recognized objects given to her by sounding the words out ( pen, elbow, and watch).  She was able to say her 's name and said he was her . She followed commands in all extremities without any difficulty.       and her sister were at bedside. Patient is scheduled to have fibroid removal surgery with  today and has been NPO.        ROS  A ten system review of constitutional, cardiovascular, respiratory, musculoskeletal, endocrine, skin, SHEENT, genitourinary, psychiatric and neurologic systems was obtained and is unremarkable except as stated in HPI     PMH  History reviewed. No pertinent past medical history.    ALLERGIES  No Known Allergies    PHYSICAL  midline shift or mass effect.    Otherwise; the brain architecture is normal. There is no evidence of midline  shift or mass-effect. There are no extra-axial fluid collections. There is no  Chiari or syrinx. The pituitary and infundibulum are grossly unremarkable. There  is no skull base mass. Cerebellopontine angles are grossly unremarkable. The  major intracranial vascular flow-voids are unremarkable. The cavernous sinuses  are symmetric. Optic chiasm and infundibulum grossly unremarkable. Orbits are  grossly symmetric.  Dural venous sinuses are grossly patent.  The mastoid air cells are well pneumatized and clear.    Impression  Moderate-sized acute cortical infarction left frontotemporal with punctate foci  of infarction at the right and left parietal lobes and right frontal lobe as  well. There is no superimposed hemorrhage, midline shift or mass effect..    No additional acute intracranial process is demonstrated.        Electronically signed by REEMA DOE Scan Result (most recent):  CT HEAD WO CONTRAST 08/25/2024    Narrative  EXAM: CT HEAD WO CONTRAST    CLINICAL HISTORY: 24 hours post thrombolytic agent  INDICATION: 24 hours post thrombolytic agent  COMPARISON: 8/25/2024.  CT dose reduction was achieved through use of a standardized protocol tailored  for this examination and automatic exposure control for dose modulation.  TECHNIQUE: Serial axial images with a collimation of 5 mm were obtained from the  skull base through the vertex  FINDINGS:  Moderate-sized acute cortical infarction in the left frontal temporal lobe with  punctate foci of infarction elsewhere.. There is no evidence of midline shift or  hydrocephalus. Posterior fossa structures are unremarkable. No extra-axial  collections are seen.  Mastoid air cells are well pneumatized and clear.  There is no evidence of depressed skull fractures of soft tissue swelling.    Impression  Moderate sized infarction left temporoparietal lobe. No  patent. Main pulmonary artery is normal caliber. No pulmonary Vince  filling defects.    Hyperemic uterine fibroids. Hyperdensities within the uterine fibroids most  likely reflect prominent feeding vessels.    NONVASCULAR:    CHEST WALL: No mass or axillary lymphadenopathy.  THYROID: No nodule.  MEDIASTINUM: No mass or lymphadenopathy.  MICHELLE: No mass or lymphadenopathy.  TRACHEA/BRONCHI: Patent.  ESOPHAGUS: No wall thickening or dilatation.  HEART: Normal in size.  PLEURA: No effusion or pneumothorax.  LUNGS: No nodule, mass, or airspace disease.    LIVER: No mass.  BILIARY TREE: Gallbladder is within normal limits. CBD is not dilated.  SPLEEN: within normal limits.  PANCREAS: No mass or ductal dilatation.  ADRENALS: Unremarkable.  KIDNEYS: No mass, calculus, or hydronephrosis.  STOMACH: Unremarkable.  SMALL BOWEL: No dilatation or wall thickening.  COLON: No dilatation or wall thickening.  APPENDIX: Unremarkable  PERITONEUM: No ascites or pneumoperitoneum.  RETROPERITONEUM: No lymphadenopathy or aortic aneurysm.  REPRODUCTIVE ORGANS: Large uterine fibroids. Fibroid in the fundus measures 6.5  cm. Fibroid which appears to extend down into the vaginal canal measures 5.9 cm.  Foci of hyperenhancement are present, which most likely reflect prominent  feeding vessels.  URINARY BLADDER: No mass or calculus.  BONES: No destructive bone lesion.  ABDOMINAL WALL: No mass or hernia.  ADDITIONAL COMMENTS: N/A    Impression  1. Prominent uterine fibroids, including one which appears to extend into the  vaginal canal. These have prominent feeding arteries. Unable to confidently  evaluate for active bleeding due to monophasic exam. No intraabdominal bleeding.    This was discussed with Dr. Mccoy by Dr. Venegas at 9:59pm on 8-24-24.    Electronically signed by RODDY VENEGAS         LAB DATA REVIEWED:      No results displayed because visit has over 200 results.          Vitals:    08/27/24 1621 08/27/24 1852 08/27/24 2012

## 2024-08-28 NOTE — CONSULTS
GYN      47 y/o  admitted through ER  with L MCA infarct s/p TNK . Patient with severe anemia and history of heavy menses. CT/US pelvis reveal large prolapsing fibroid through cervix and another fundal myoma. Patient states she has heavy regular menses and intramenstrual spotting throughout the month. + PCB. Significant dysmenorrhea treated with OTC medications. Patient has no vaginal bleeding at present. Is to start Plavix which will make vaginal bleeding heavier. She received 2u PRBC this hospitalization.     PMH: CVA  PSH: none  OBH:  x 5          Vasectomy for contraception    No Known Allergies    Current Facility-Administered Medications   Medication Dose Route Frequency Provider Last Rate Last Admin    iron sucrose (VENOFER) injection 200 mg  200 mg IntraVENous Q24H Junior Quinonez MD   200 mg at 24 1313    metoprolol succinate (TOPROL XL) extended release tablet 12.5 mg  12.5 mg Oral Daily Vic Marc MD   12.5 mg at 24 0931    pantoprazole (PROTONIX) 40 mg in sodium chloride (PF) 0.9 % 10 mL injection  40 mg IntraVENous Q12H Austin Davison APRN - NP   40 mg at 24 0344    calcium carbonate (TUMS) chewable tablet 500 mg  500 mg Oral TID PRN Austin Davison APRN - NP   500 mg at 24 0304    0.9 % sodium chloride infusion   IntraVENous PRN Austin Davison APRN - ALDO        cefTRIAXone (ROCEPHIN) 1,000 mg in sodium chloride 0.9 % 50 mL IVPB (mini-bag)  1,000 mg IntraVENous Q24H Sanjay David MD   Stopped at 24 1135    lidocaine 4 % external patch 2 patch  2 patch TransDERmal Daily Austin Davison APRN - NP   2 patch at 24 1122    dextrose bolus 10% 125 mL  125 mL IntraVENous PRN Vernon Stone MD        sodium chloride flush 0.9 % injection 5-40 mL  5-40 mL IntraVENous 2 times per day Aleksandra Somers APRN - NP   10 mL at 24 0930    sodium chloride flush 0.9 % injection 5-40 mL  5-40 mL IntraVENous PRN  Aleksandra Somers APRN - NP        0.9 % sodium chloride infusion   IntraVENous PRN Aleksandra Somers APRN -  mL/hr at 08/27/24 1028 New Bag at 08/27/24 1028    potassium chloride 20 mEq/50 mL IVPB (Central Line)  20 mEq IntraVENous PRN Aleksandra Somers APRN - NP        Or    potassium chloride 10 mEq/100 mL IVPB (Peripheral Line)  10 mEq IntraVENous PRN Aleksandra Somers APRN - NP   Stopped at 08/26/24 0504    magnesium sulfate 2000 mg in 50 mL IVPB premix  2,000 mg IntraVENous PRN Aleksandra Somers APRN - NP        acetaminophen (TYLENOL) tablet 650 mg  650 mg Oral Q6H PRN Aleksandra Somers APRN - NP   650 mg at 08/27/24 0152    Or    acetaminophen (TYLENOL) suppository 650 mg  650 mg Rectal Q6H PRN Aleksandra Somers APRN - NP        ondansetron (ZOFRAN-ODT) disintegrating tablet 4 mg  4 mg Oral Q8H PRN Aleksandra Somers APRN - NP        Or    ondansetron (ZOFRAN) injection 4 mg  4 mg IntraVENous Q6H PRN Aleksandra Somers APRN - NP   4 mg at 08/26/24 0520    polyethylene glycol (GLYCOLAX) packet 17 g  17 g Oral Daily PRN Aleksandra Somers APRN - NP        aspirin chewable tablet 81 mg  81 mg Oral Daily Aleksandra Somers APRN - NP   81 mg at 08/27/24 1014    Or    aspirin suppository 300 mg  300 mg Rectal Daily Aleksandra Somers APRN - NP        atorvastatin (LIPITOR) tablet 80 mg  80 mg Oral Nightly Aleksandra Somers APRN - NP   80 mg at 08/27/24 2021     /71   Pulse 96   Temp 98.6 °F (37 °C) (Oral)   Resp 18   Ht 1.651 m (5' 5\")   Wt 56.7 kg (125 lb)   SpO2 100%   BMI 20.80 kg/m²   PE: deferred    IMP: 49 y/o female s/p L MCA infact           Fibroid uterus with menorrhagia and AUB          Severe anemia, s/p 2U PRBC    Plan: Cardiology recommends TERESA and cardiac stress test prior to surgical procedures. Scheduled for today and tomorrow.  Posted GYN procedure for Friday, 2pm   Planned procedure discussed with patient and family and they are in agreement.   Exam under anesthesia   Myomectomy of

## 2024-08-28 NOTE — PROGRESS NOTES
Patient arrived to Non-Invasive Cardiology Lab for Out Patient TERESA Procedure. Staff introduced to patient. Patient identifiers verified with Name and Date of Birth. Procedure verified with patient. Consent forms reviewed and signed by patient or authorized representative and verified. Allergies verified. Patient informed of procedure and plan of care. Questions answered with review.     Patient on cardiac monitor, non-invasive blood pressure, SPO2 monitor. On room air. Patient is A&Ox3. Patient reports no complaints.    Patient on stretcher, in low position, with side rails up. Patient instructed to call for assistance as needed.    Family in waiting room.

## 2024-08-28 NOTE — PROGRESS NOTES
TRANSFER - OUT REPORT:    Verbal report given to Charlette (name) on Isabella Gomez being transferred to Neuro (unit) for routine progression of patient care       Report consisted of patient's Situation, Background, Assessment and   Recommendations(SBAR).     Information from the following report(s) MAR, Recent Results, and Cardiac Rhythm NSR  was reviewed with the receiving nurse.    Opportunity for questions and clarification was provided.      Patient transported with:   Tech

## 2024-08-29 ENCOUNTER — APPOINTMENT (OUTPATIENT)
Facility: HOSPITAL | Age: 49
End: 2024-08-29
Payer: COMMERCIAL

## 2024-08-29 ENCOUNTER — TELEPHONE (OUTPATIENT)
Age: 49
End: 2024-08-29

## 2024-08-29 LAB
ANION GAP SERPL CALC-SCNC: 11 MMOL/L (ref 5–15)
ANION GAP SERPL CALC-SCNC: 11 MMOL/L (ref 5–15)
BASOPHILS # BLD: 0 K/UL (ref 0–0.1)
BASOPHILS # BLD: 0 K/UL (ref 0–0.1)
BASOPHILS NFR BLD: 0 % (ref 0–1)
BASOPHILS NFR BLD: 0 % (ref 0–1)
BUN SERPL-MCNC: 11 MG/DL (ref 6–20)
BUN SERPL-MCNC: 11 MG/DL (ref 6–20)
BUN/CREAT SERPL: 17 (ref 12–20)
BUN/CREAT SERPL: 17 (ref 12–20)
CALCIUM SERPL-MCNC: 7.9 MG/DL (ref 8.5–10.1)
CALCIUM SERPL-MCNC: 7.9 MG/DL (ref 8.5–10.1)
CHLORIDE SERPL-SCNC: 112 MMOL/L (ref 97–108)
CHLORIDE SERPL-SCNC: 112 MMOL/L (ref 97–108)
CO2 SERPL-SCNC: 21 MMOL/L (ref 21–32)
CO2 SERPL-SCNC: 21 MMOL/L (ref 21–32)
COMMENT:: NORMAL
COMMENT:: NORMAL
CREAT SERPL-MCNC: 0.66 MG/DL (ref 0.55–1.02)
CREAT SERPL-MCNC: 0.66 MG/DL (ref 0.55–1.02)
DIFFERENTIAL METHOD BLD: ABNORMAL
DIFFERENTIAL METHOD BLD: ABNORMAL
ECHO BSA: 1.61 M2
ECHO LV EF PHYSICIAN: 50 %
ECHO LV EF PHYSICIAN: 50 %
EKG DIAGNOSIS: NORMAL
EKG DIAGNOSIS: NORMAL
EOSINOPHIL # BLD: 0 K/UL (ref 0–0.4)
EOSINOPHIL # BLD: 0 K/UL (ref 0–0.4)
EOSINOPHIL NFR BLD: 1 % (ref 0–7)
EOSINOPHIL NFR BLD: 1 % (ref 0–7)
ERYTHROCYTE [DISTWIDTH] IN BLOOD BY AUTOMATED COUNT: 28 % (ref 11.5–14.5)
ERYTHROCYTE [DISTWIDTH] IN BLOOD BY AUTOMATED COUNT: 28 % (ref 11.5–14.5)
GLUCOSE SERPL-MCNC: 132 MG/DL (ref 65–100)
GLUCOSE SERPL-MCNC: 132 MG/DL (ref 65–100)
HCT VFR BLD AUTO: 27.7 % (ref 35–47)
HCT VFR BLD AUTO: 27.7 % (ref 35–47)
HGB BLD-MCNC: 7.7 G/DL (ref 11.5–16)
HGB BLD-MCNC: 7.7 G/DL (ref 11.5–16)
IMM GRANULOCYTES # BLD AUTO: 0 K/UL (ref 0–0.04)
IMM GRANULOCYTES # BLD AUTO: 0 K/UL (ref 0–0.04)
IMM GRANULOCYTES NFR BLD AUTO: 1 % (ref 0–0.5)
IMM GRANULOCYTES NFR BLD AUTO: 1 % (ref 0–0.5)
LYMPHOCYTES # BLD: 0.6 K/UL (ref 0.8–3.5)
LYMPHOCYTES # BLD: 0.6 K/UL (ref 0.8–3.5)
LYMPHOCYTES NFR BLD: 15 % (ref 12–49)
LYMPHOCYTES NFR BLD: 15 % (ref 12–49)
MAGNESIUM SERPL-MCNC: 1.8 MG/DL (ref 1.6–2.4)
MAGNESIUM SERPL-MCNC: 1.8 MG/DL (ref 1.6–2.4)
MCH RBC QN AUTO: 19.8 PG (ref 26–34)
MCH RBC QN AUTO: 19.8 PG (ref 26–34)
MCHC RBC AUTO-ENTMCNC: 27.8 G/DL (ref 30–36.5)
MCHC RBC AUTO-ENTMCNC: 27.8 G/DL (ref 30–36.5)
MCV RBC AUTO: 71.4 FL (ref 80–99)
MCV RBC AUTO: 71.4 FL (ref 80–99)
MONOCYTES # BLD: 0.3 K/UL (ref 0–1)
MONOCYTES # BLD: 0.3 K/UL (ref 0–1)
MONOCYTES NFR BLD: 7 % (ref 5–13)
MONOCYTES NFR BLD: 7 % (ref 5–13)
NEUTS SEG # BLD: 3.2 K/UL (ref 1.8–8)
NEUTS SEG # BLD: 3.2 K/UL (ref 1.8–8)
NEUTS SEG NFR BLD: 76 % (ref 32–75)
NEUTS SEG NFR BLD: 76 % (ref 32–75)
NRBC # BLD: 0 K/UL (ref 0–0.01)
NRBC # BLD: 0 K/UL (ref 0–0.01)
NRBC BLD-RTO: 0 PER 100 WBC
NRBC BLD-RTO: 0 PER 100 WBC
PLATELET # BLD AUTO: 186 K/UL (ref 150–400)
PLATELET # BLD AUTO: 186 K/UL (ref 150–400)
PMV BLD AUTO: 9.7 FL (ref 8.9–12.9)
PMV BLD AUTO: 9.7 FL (ref 8.9–12.9)
POTASSIUM SERPL-SCNC: 3.2 MMOL/L (ref 3.5–5.1)
POTASSIUM SERPL-SCNC: 3.2 MMOL/L (ref 3.5–5.1)
RBC # BLD AUTO: 3.88 M/UL (ref 3.8–5.2)
RBC # BLD AUTO: 3.88 M/UL (ref 3.8–5.2)
RBC MORPH BLD: ABNORMAL
SODIUM SERPL-SCNC: 144 MMOL/L (ref 136–145)
SODIUM SERPL-SCNC: 144 MMOL/L (ref 136–145)
SPECIMEN HOLD: NORMAL
SPECIMEN HOLD: NORMAL
STRESS BASELINE DIAS BP: 80 MMHG
STRESS BASELINE DIAS BP: 80 MMHG
STRESS BASELINE HR: 106 BPM
STRESS BASELINE HR: 106 BPM
STRESS BASELINE ST DEPRESSION: 0 MM
STRESS BASELINE ST DEPRESSION: 0 MM
STRESS BASELINE SYS BP: 151 MMHG
STRESS BASELINE SYS BP: 151 MMHG
STRESS ESTIMATED WORKLOAD: 1 METS
STRESS ESTIMATED WORKLOAD: 1 METS
STRESS O2 SAT PEAK: 95 %
STRESS O2 SAT PEAK: 95 %
STRESS O2 SAT REST: 95 %
STRESS O2 SAT REST: 95 %
STRESS PEAK DIAS BP: 79 MMHG
STRESS PEAK DIAS BP: 79 MMHG
STRESS PEAK SYS BP: 154 MMHG
STRESS PEAK SYS BP: 154 MMHG
STRESS PERCENT HR ACHIEVED: 85 %
STRESS PERCENT HR ACHIEVED: 85 %
STRESS POST PEAK HR: 146 BPM
STRESS POST PEAK HR: 146 BPM
STRESS RATE PRESSURE PRODUCT: NORMAL BPM*MMHG
STRESS RATE PRESSURE PRODUCT: NORMAL BPM*MMHG
STRESS TARGET HR: 172 BPM
STRESS TARGET HR: 172 BPM
WBC # BLD AUTO: 4.1 K/UL (ref 3.6–11)
WBC # BLD AUTO: 4.1 K/UL (ref 3.6–11)

## 2024-08-29 PROCEDURE — 36415 COLL VENOUS BLD VENIPUNCTURE: CPT

## 2024-08-29 PROCEDURE — 6370000000 HC RX 637 (ALT 250 FOR IP): Performed by: STUDENT IN AN ORGANIZED HEALTH CARE EDUCATION/TRAINING PROGRAM

## 2024-08-29 PROCEDURE — A9500 TC99M SESTAMIBI: HCPCS | Performed by: STUDENT IN AN ORGANIZED HEALTH CARE EDUCATION/TRAINING PROGRAM

## 2024-08-29 PROCEDURE — 2580000003 HC RX 258: Performed by: NURSE PRACTITIONER

## 2024-08-29 PROCEDURE — 78452 HT MUSCLE IMAGE SPECT MULT: CPT

## 2024-08-29 PROCEDURE — 6360000002 HC RX W HCPCS: Performed by: NURSE PRACTITIONER

## 2024-08-29 PROCEDURE — 83735 ASSAY OF MAGNESIUM: CPT

## 2024-08-29 PROCEDURE — 99233 SBSQ HOSP IP/OBS HIGH 50: CPT

## 2024-08-29 PROCEDURE — 85025 COMPLETE CBC W/AUTO DIFF WBC: CPT

## 2024-08-29 PROCEDURE — 80048 BASIC METABOLIC PNL TOTAL CA: CPT

## 2024-08-29 PROCEDURE — 1100000003 HC PRIVATE W/ TELEMETRY

## 2024-08-29 PROCEDURE — 6360000002 HC RX W HCPCS: Performed by: INTERNAL MEDICINE

## 2024-08-29 PROCEDURE — 93017 CV STRESS TEST TRACING ONLY: CPT

## 2024-08-29 PROCEDURE — 3430000000 HC RX DIAGNOSTIC RADIOPHARMACEUTICAL: Performed by: STUDENT IN AN ORGANIZED HEALTH CARE EDUCATION/TRAINING PROGRAM

## 2024-08-29 PROCEDURE — 6360000002 HC RX W HCPCS: Performed by: STUDENT IN AN ORGANIZED HEALTH CARE EDUCATION/TRAINING PROGRAM

## 2024-08-29 PROCEDURE — 6370000000 HC RX 637 (ALT 250 FOR IP): Performed by: NURSE PRACTITIONER

## 2024-08-29 PROCEDURE — 2580000003 HC RX 258: Performed by: INTERNAL MEDICINE

## 2024-08-29 PROCEDURE — 93970 EXTREMITY STUDY: CPT

## 2024-08-29 RX ORDER — TETRAKIS(2-METHOXYISOBUTYLISOCYANIDE)COPPER(I) TETRAFLUOROBORATE 1 MG/ML
10.2 INJECTION, POWDER, LYOPHILIZED, FOR SOLUTION INTRAVENOUS
Status: COMPLETED | OUTPATIENT
Start: 2024-08-29 | End: 2024-08-29

## 2024-08-29 RX ORDER — METOPROLOL SUCCINATE 25 MG/1
25 TABLET, EXTENDED RELEASE ORAL DAILY
Status: DISCONTINUED | OUTPATIENT
Start: 2024-08-30 | End: 2024-08-30 | Stop reason: HOSPADM

## 2024-08-29 RX ORDER — REGADENOSON 0.08 MG/ML
0.4 INJECTION, SOLUTION INTRAVENOUS
Status: COMPLETED | OUTPATIENT
Start: 2024-08-29 | End: 2024-08-29

## 2024-08-29 RX ORDER — TETRAKIS(2-METHOXYISOBUTYLISOCYANIDE)COPPER(I) TETRAFLUOROBORATE 1 MG/ML
32.4 INJECTION, POWDER, LYOPHILIZED, FOR SOLUTION INTRAVENOUS
Status: COMPLETED | OUTPATIENT
Start: 2024-08-29 | End: 2024-08-29

## 2024-08-29 RX ADMIN — ASPIRIN 81 MG: 81 TABLET, CHEWABLE ORAL at 10:56

## 2024-08-29 RX ADMIN — ATORVASTATIN CALCIUM 80 MG: 40 TABLET, FILM COATED ORAL at 20:51

## 2024-08-29 RX ADMIN — METOPROLOL SUCCINATE 12.5 MG: 25 TABLET, EXTENDED RELEASE ORAL at 10:55

## 2024-08-29 RX ADMIN — TETRAKIS(2-METHOXYISOBUTYLISOCYANIDE)COPPER(I) TETRAFLUOROBORATE 32.4 MILLICURIE: 1 INJECTION, POWDER, LYOPHILIZED, FOR SOLUTION INTRAVENOUS at 08:05

## 2024-08-29 RX ADMIN — TETRAKIS(2-METHOXYISOBUTYLISOCYANIDE)COPPER(I) TETRAFLUOROBORATE 10.2 MILLICURIE: 1 INJECTION, POWDER, LYOPHILIZED, FOR SOLUTION INTRAVENOUS at 08:05

## 2024-08-29 RX ADMIN — CEFTRIAXONE SODIUM 1000 MG: 1 INJECTION, POWDER, FOR SOLUTION INTRAMUSCULAR; INTRAVENOUS at 11:00

## 2024-08-29 RX ADMIN — SODIUM CHLORIDE, PRESERVATIVE FREE 10 ML: 5 INJECTION INTRAVENOUS at 20:51

## 2024-08-29 RX ADMIN — REGADENOSON 0.4 MG: 0.08 INJECTION, SOLUTION INTRAVENOUS at 09:33

## 2024-08-29 RX ADMIN — PANTOPRAZOLE SODIUM 40 MG: 40 INJECTION, POWDER, FOR SOLUTION INTRAVENOUS at 16:31

## 2024-08-29 RX ADMIN — PANTOPRAZOLE SODIUM 40 MG: 40 INJECTION, POWDER, FOR SOLUTION INTRAVENOUS at 06:05

## 2024-08-29 ASSESSMENT — PAIN SCALES - GENERAL
PAINLEVEL_OUTOF10: 0
PAINLEVEL_OUTOF10: 0

## 2024-08-29 NOTE — PLAN OF CARE
Problem: Discharge Planning  Goal: Discharge to home or other facility with appropriate resources  8/29/2024 1154 by Nany Hoskins RN  Outcome: Progressing  8/29/2024 0037 by Sohail Bauman RN  Outcome: Progressing     Problem: Safety - Adult  Goal: Free from fall injury  8/29/2024 1154 by Nany Hoskins RN  Outcome: Progressing  8/29/2024 0037 by Sohail Bauman RN  Outcome: Progressing     Problem: Skin/Tissue Integrity  Goal: Absence of new skin breakdown  Description: 1.  Monitor for areas of redness and/or skin breakdown  2.  Assess vascular access sites hourly  3.  Every 4-6 hours minimum:  Change oxygen saturation probe site  4.  Every 4-6 hours:  If on nasal continuous positive airway pressure, respiratory therapy assess nares and determine need for appliance change or resting period.  8/29/2024 1154 by Nany Hoskins RN  Outcome: Progressing  8/29/2024 0037 by Sohail Bauman RN  Outcome: Progressing     Problem: Pain  Goal: Verbalizes/displays adequate comfort level or baseline comfort level  8/29/2024 1154 by Nany Hoskins RN  Outcome: Progressing  8/29/2024 0037 by Sohail Bauman RN  Outcome: Progressing     Problem: Neurosensory - Adult  Goal: Achieves stable or improved neurological status  8/29/2024 1154 by Nany Hoskins RN  Outcome: Progressing  8/29/2024 0037 by Sohail Bauman RN  Outcome: Progressing  Goal: Achieves maximal functionality and self care  8/29/2024 1154 by Nany Hoskins RN  Outcome: Progressing  8/29/2024 0037 by Sohail Bauman RN  Outcome: Progressing     Problem: Musculoskeletal - Adult  Goal: Return mobility to safest level of function  8/29/2024 1154 by Nany Hoskins RN  Outcome: Progressing  8/29/2024 0037 by Sohail Bauman RN  Outcome: Progressing  Goal: Return ADL status to a safe level of function  8/29/2024 1154 by Nany Hoskins RN  Outcome: Progressing  8/29/2024 0037 by Sohail Bauman RN  Outcome: Progressing     Problem: Metabolic/Fluid and  Electrolytes - Adult  Goal: Electrolytes maintained within normal limits  8/29/2024 1154 by Nany Hoskins, RN  Outcome: Progressing  8/29/2024 0037 by Sohail Bauman, RN  Outcome: Progressing

## 2024-08-29 NOTE — PROGRESS NOTES
.End of Shift Note    Bedside shift change report given to ELVER Marinelli  (oncoming nurse) by Sohail Bauman RN .        Shift worked: 7PM-7AM   Shift summary and any significant changes:       Patient NPO at midnight  Procedure stress test  Pending OB surgery on Friday      Concerns for physician to address: None   Zone phone for oncoming shift:       Patient Information  Isabella Gomez  48 y.o.  8/24/2024  8:11 PM by Laine Michel MD. Isabella Gomez was admitted from [facility]    Problem List  Patient Active Problem List    Diagnosis Date Noted    Acute CVA (cerebrovascular accident) (HCC) 08/24/2024     History reviewed. No pertinent past medical history.    Core Measures:  CVA: yes  CHF: no  PNA: no    Activity:  Level of Assistance: Minimal assist, patient does 75% or more  Number times ambulated in hallways past shift: 1  Number of times OOB to chair past shift: 0    Cardiac:   Cardiac Monitoring: yes,     Access:   Current line(s): PIV    Respiratory:   O2 Device: None (Room air)    GI:  Last BM (including prior to admit): 08/26/24  Current diet:  Diet NPO  Tolerating current diet: Yes    Pain Management:   Patient states pain is manageable on current regimen: yes    Skin:  Ancelmo Scale Score: 20  Interventions: N/A  Pressure injury: no    Patient Safety:  Fall Score: Rosales Total Score: 20  Interventions: bed alarm  Self-release roll belt: No  Dexterity to release roll belt: no   (must document dexterity  here by stating Yes or No here, otherwise this is a restraint and must follow restraint documentation policy.)    DVT prophylaxis:  DVT prophylaxis: meds    Active Consults:  IP CONSULT TO TELE-NEUROLOGY  IP CONSULT TO NEUROLOGY  IP CONSULT TO NEUROLOGY  IP CONSULT TO OB GYN  IP CONSULT TO CARDIOLOGY  IP CONSULT TO CARDIOLOGY    Length of Stay:  Expected LOS: 7  Actual LOS: 5    Sohail Bauman RN

## 2024-08-29 NOTE — PROGRESS NOTES
[facility]  [address] [place] [address] [phone]  [email or web address]  CARDIOLOGY PROGRESS NOTE    8/29/2024 1:10 PM    Admit Date: 8/24/2024    Admit Diagnosis:   Acute CVA (cerebrovascular accident) (HCC) [I63.9]    Subjective:     Isabella Gomez was seen and examined at the bedside.  No sore throat.  No other complaints.  Patient's sister and patient's  are at the bedside.  Explained to them the results from the TERESA, echo with Definity contrast as well as the pending nuclear stress test results from today.    /76   Pulse 97   Temp 98.6 °F (37 °C) (Oral)   Resp 18   Ht 1.651 m (5' 5\")   Wt 56.7 kg (125 lb)   SpO2 100%   BMI 20.80 kg/m²     Current Facility-Administered Medications   Medication Dose Route Frequency    metoprolol succinate (TOPROL XL) extended release tablet 12.5 mg  12.5 mg Oral Daily    pantoprazole (PROTONIX) 40 mg in sodium chloride (PF) 0.9 % 10 mL injection  40 mg IntraVENous Q12H    calcium carbonate (TUMS) chewable tablet 500 mg  500 mg Oral TID PRN    0.9 % sodium chloride infusion   IntraVENous PRN    cefTRIAXone (ROCEPHIN) 1,000 mg in sodium chloride 0.9 % 50 mL IVPB (mini-bag)  1,000 mg IntraVENous Q24H    lidocaine 4 % external patch 2 patch  2 patch TransDERmal Daily    dextrose bolus 10% 125 mL  125 mL IntraVENous PRN    sodium chloride flush 0.9 % injection 5-40 mL  5-40 mL IntraVENous 2 times per day    sodium chloride flush 0.9 % injection 5-40 mL  5-40 mL IntraVENous PRN    0.9 % sodium chloride infusion   IntraVENous PRN    potassium chloride 20 mEq/50 mL IVPB (Central Line)  20 mEq IntraVENous PRN    Or    potassium chloride 10 mEq/100 mL IVPB (Peripheral Line)  10 mEq IntraVENous PRN    magnesium sulfate 2000 mg in 50 mL IVPB premix  2,000 mg IntraVENous PRN    acetaminophen (TYLENOL) tablet 650 mg  650 mg Oral Q6H PRN    Or    acetaminophen (TYLENOL) suppository 650 mg  650 mg Rectal Q6H  follow.      Vic Marc MD

## 2024-08-29 NOTE — CARE COORDINATION
Transition of Care Plan:    RUR: 10% (low RUR)   Prior Level of Functioning: Independent  Disposition: Home with outpatient SLP  If SNF or IPR: Date FOC offered:   Date FOC received:   Accepting facility:   Date authorization started with reference number:   Date authorization received and expires:   Follow up appointments: PCP/Specialists as indicated  DME needed: None at this time  Transportation at discharge: Family to transport  IM/IMM Medicare/ letter given: N/a - Commercial   Is patient a [place] and connected with [facility]? N/a   If yes, was  transfer form completed and VA notified? N/a  Caregiver Contact: Spouse:Jorge Gomez: [phone]   Discharge Caregiver contacted prior to discharge? Pt to contact  Care Conference needed? Not at this time  Barriers to discharge: Medical araceli     0853 - Chart reviewed. Plan remains home 08/30-08/31 with outpatient SLP. Family to transport. CM to continue following for any additional needs.     ALAYNA Weaver  Care Management  [facility]  [id number]

## 2024-08-29 NOTE — PROGRESS NOTES
Hospitalist Progress Note    NAME:   Isabella Gomez   : 1975   MRN: [id number]     Date/Time: 2024 8:29 PM  Patient PCP: No primary care provider on file.    Estimated discharge date:     Barriers: Cardas and GYN clearance    ICU transfer:   Linnea Gomez is a 49 yo female with a PMH of PAOLO (not on supplementation) who presented to the ED with acute onset expressive aphasia that began 45 min prior to ED arrival.    She was given TNK in the ED.   CT head and neck showed L M3 segment occlusion with correlating perfusion defect.   Transferred out ICU 2024     Assessment / Plan:    Moderate-sized acute cortical infarction left frontotemporal POA   Punctate foci of infarction at the right and left parietal lobes and right frontal lobe POA  Acute onset expressive aphasia POA  Presented with acute onset expressive aphasia  Admit head CT IMPRESSION:  No acute intracranial process.  There is no intracranial mass or hemorrhage.  CTA head and neck impression:   No carotid stenosis   No intracranial atherosclerosis  CT Perfusion:  area of delayed perfusion in the left posterior temporal/occipital  lobe, correlating with the left M3 segment occlusion  IMPRESSION:      Left M3 segment occlusion, with correlating perfusion defect.  MRI 2024 IMPRESSION:  Moderate-sized acute cortical infarction left frontotemporal   punctate foci of infarction at the right and left parietal lobes and right frontal lobe as well.   No superimposed hemorrhage, midline shift or mass effect..  Admit TTE, LVEF 50-55%, normal left atrial size, normal mitral and aortic valves  Repeat echo with agitated saline  Interatrial Septum: Agitated saline study was negative with and without provocation.   Seen by Tele neuro in ED  TNK given in ED on evening 2024   Head CT at 24 hours with no evidence of hemorrhage  Transferred to floor 2024  Neurology following   ASA   No plavix recommended  HTN BP goal < 140's  systolic   LDL 34  HgBa1c < 3.8  Tele reviewed  Unclear mechanism leading to the stroke  TERESA 8/28/2024  Left Ventricle   Mildly reduced left ventricular systolic function with a visually estimated EF of 45 - 50%. Left ventricle size is normal. Normal wall thickness. Normal wall motion.   Left Atrium   Left atrium size is normal. Normal sized appendage. No left atrial appendage thrombus noted. No left atrial appendage mass noted.   Interatrial Septum   No interatrial shunt visualized with color Doppler. Agitated saline study was negative with and without provocation.   Right Ventricle   Right ventricle size is normal. Normal systolic function.   Right Atrium   Right atrium size is normal.   Aortic Valve   Valve structure is normal. No regurgitation. No stenosis.   Mitral Valve   Valve structure is normal. No regurgitation. No stenosis noted.   Tricuspid Valve   Valve structure is normal. No regurgitation. No stenosis noted.         Iron deficiency anemia POA HgB 5.3 to 5.9, MCV 64,  Ferritin 2, Iron 11, TIBC 365  Acute on chronic blood loss anemia POA admit HgB 5.3 to 5.9,  s/p 2 units pRBCS  Protruding uterine fibroids in the vaginal canal POA  CT abdomen pelvis showed   Prominent uterine fibroids, including one which appears to extend into the vaginal canal. These have prominent feeding arteries.    No intraabdominal bleeding.  Given 2 units pRBCs  Given iron infusions ~ 700 mg  Seen by GYN  Planning for myomectomy when cardiac work up complete    Elevated troponin 5703 --> 2239 POA  Currently CP free  ? Type 2 MI with severe anemia  Troponin was significantly elevated on admission above 5000, trended down to 4700  Cardiology consulted  2D echo showed diastolic dysfunction, EF of 55%  Stress test in AM    Hypokalemia  Resolved    Code Status: full code   DVT Prophylaxis: Aspirin  GI Prophylaxis: protonix     Medical Decision Making:   I personally reviewed labs:bmp, cbc   I personally reviewed imaging:  I  most current lab test results and cultures  YES  Reviewed most current radiology test results   YES  Review and summation of old records today    NO  Reviewed patient's current orders and MAR    YES  PMH/SH reviewed - no change compared to H&P    Procedures: see electronic medical records for all procedures/Xrays and details which were not copied into this note but were reviewed prior to creation of Plan.      LABS:  I reviewed today's most current labs and imaging studies.  Pertinent labs include:  Recent Labs     08/26/24  0313 08/26/24  1328 08/27/24  0159   WBC 5.0  --  4.8   HGB 6.9* 8.5* 8.4*   HCT 25.2* 30.6* 30.2*     --  189     Recent Labs     08/26/24  0313 08/27/24  0159    138   K 3.4* 4.1    106   CO2 22 26   GLUCOSE 117* 95   BUN 10 10   CREATININE 0.70 0.81   CALCIUM 8.4* 8.9   MG 1.9  --        Signed: Alexander Ma Jr, MD

## 2024-08-29 NOTE — PROGRESS NOTES
Patient taken off the floor for procedure.  Nurse has not had a chance to assess skin/orientation.  Patient was resting in bed snoring on change of shift.

## 2024-08-29 NOTE — TELEPHONE ENCOUNTER
Hello,    Can this patient be scheduled for hospital follow-up in 4 to 8 weeks with any other neurology providers for stroke?    Thanks,  Hans

## 2024-08-29 NOTE — PROGRESS NOTES
.End of Shift Note    Bedside shift change report given to  ELVER Sauceda  (oncoming nurse) by ELVER Ayon.        Shift worked: Days   Shift summary and any significant changes:    Doppler of legs completed, Stress test completed.  Potential Fibroid removal/cardiac cath tomorrow.     Concerns for physician to address: None   Zone phone for oncoming shift:       Patient Information  Isabella Gomez  48 y.o.  8/24/2024  8:11 PM by Laine Michel MD. Isabella Gomez was admitted from [facility]    Problem List  Patient Active Problem List    Diagnosis Date Noted    Acute CVA (cerebrovascular accident) (HCC) 08/24/2024     History reviewed. No pertinent past medical history.    Core Measures:  CVA: yes  CHF: no  PNA: no    Activity:  Level of Assistance: Minimal assist, patient does 75% or more  Number times ambulated in hallways past shift: 1  Number of times OOB to chair past shift: 0    Cardiac:   Cardiac Monitoring: yes,     Access:   Current line(s): PIV    Respiratory:   O2 Device: None (Room air)    GI:  Last BM (including prior to admit): 08/29/24 (Loose, small amount)  Current diet:  ADULT DIET; Regular  Tolerating current diet: Yes    Pain Management:   Patient states pain is manageable on current regimen: yes    Skin:  Ancelmo Scale Score: 22  Interventions: N/A  Pressure injury: no    Patient Safety:  Fall Score: Rosales Total Score: 35  Interventions: bed alarm  Self-release roll belt: No  Dexterity to release roll belt: no   (must document dexterity  here by stating Yes or No here, otherwise this is a restraint and must follow restraint documentation policy.)    DVT prophylaxis:  DVT prophylaxis: meds    Active Consults:  IP CONSULT TO TELE-NEUROLOGY  IP CONSULT TO NEUROLOGY  IP CONSULT TO NEUROLOGY  IP CONSULT TO OB GYN  IP CONSULT TO CARDIOLOGY  IP CONSULT TO CARDIOLOGY    Length of Stay:  Expected LOS: 7  Actual LOS: 5    ELVER Ayon

## 2024-08-29 NOTE — PROGRESS NOTES
HOSPITAL NEUROLOGY NOTE     Chief Complaint   Patient presents with    Aphasia     Difficulty finding words and following commands due to processing words used.         hospitals  History excerpted from Dr. Shah's note on 8/25/2024  \"Isabella Gomez is a 48 y.o. female  with no significant past medical history presenting with acute onset of word finding difficulties, found to have a distal left M3 occlusion, received thrombolytic therapy, transferred to the ICU.     Patient is aphasic and has some difficulty providing history.  She states that she had no slurred speech only difficulty with her words.  She is still having persistent aphasia.  She has no other neurological symptoms.     She had a head CT which showed no acute intracranial process, CTA head and neck, CTP revealing for left M3 segment occlusion with delayed perfusion in the left posterior temporal/occipital lobe.  Discussion with neuroIR which did not recommend thrombectomy\".        INTERIM DATA: At the time of my evaluation this morning, patient was awake and alert and oriented. Her exam today continued to improve.  She continued to recognize objects given to her by sounding the words out ( pen, elbow, and watch, glasses, cell phone). She followed commands in all extremities without any difficulty.       and her sister were at bedside.      ROS  A ten system review of constitutional, cardiovascular, respiratory, musculoskeletal, endocrine, skin, SHEENT, genitourinary, psychiatric and neurologic systems was obtained and is unremarkable except as stated in HPI     PMH  History reviewed. No pertinent past medical history.    ALLERGIES  No Known Allergies    PHYSICAL EXAMINATION:     General:   General appearance: Pt is in no acute distress   Distal pulses are preserved  Fundoscopic exam: attempted    Neurological Examination:   Mental Status: Awake, alert, oriented to 's name, place, year but did have slight difficulty with the month.  She  visually estimated EF of 50-55%.  No mass present.    - TERESA completed on 8/28/2024 with contrast: Mildly reduced left ventricular systolic function with a visually estimated EF of 45-50%.  No left atrial appendage thrombus noted.  No left atrial appendage mass noted.  Bubble study was negative.      RECOMMENDATIONS:  #evolving left MCA territory infarct, aphasia: She does have cardioembolic strokes, given the bihemispheric distribution.    Etiology secondary to distal L m3 occlusion, concerning for cardioembolic phenomenon vs hypercoagulable state.   She does not have obvious risk factors for stroke, however, she is anemic which can increase her risk for stroke given her history of fibroids, likely causing iron deficiency anemia.  - MRI brain w/o C - Dr. Shah reviewed MRI with radiology Dr. Crowder, who suspected that patient had a prior right frontal lobe infarct in the past with now some chronic hemosiderin staining seen on SWI sequence MRI.  - TTE : showed low normal left ventricular systolic function with a visually estimated EF of 55-55%.  There was no mention of PFO or thrombus and was not done with contrast. TTE was reordered with contrast to rule out PFO or thrombus.    - Repeat TTE with contrast showed low normal left ventricular systolic function with a visually estimated EF of 50-55%.  Negative for PFO.  Cardiology following and has ordered TERESA which showed low normal left ventricular systolic function with a visually estimated EF of 50-55%.  No mass noted.  Suspected acute coronary syndrome and new diagnosis of cardiomyopathy which certainly can could have contributed to the strokes. Cardiology plans to do outpatient cath if stress test is abnormal.     - TERESA: Mildly reduced left ventricular systolic function with a visually estimated EF of 45-50%.  No left atrial appendage thrombus noted.  No left atrial appendage mass noted.  Bubble study was negative.    - Hypercoagulable panel :Antiphospholipid

## 2024-08-30 VITALS
HEART RATE: 84 BPM | HEIGHT: 65 IN | BODY MASS INDEX: 20.83 KG/M2 | BODY MASS INDEX: 20.83 KG/M2 | HEART RATE: 84 BPM | OXYGEN SATURATION: 100 % | WEIGHT: 125 LBS | DIASTOLIC BLOOD PRESSURE: 75 MMHG | TEMPERATURE: 98.4 F | HEIGHT: 65 IN | SYSTOLIC BLOOD PRESSURE: 137 MMHG | OXYGEN SATURATION: 100 % | SYSTOLIC BLOOD PRESSURE: 137 MMHG | WEIGHT: 125 LBS | TEMPERATURE: 98.4 F | RESPIRATION RATE: 18 BRPM | RESPIRATION RATE: 18 BRPM | DIASTOLIC BLOOD PRESSURE: 75 MMHG

## 2024-08-30 LAB
ANION GAP SERPL CALC-SCNC: 8 MMOL/L (ref 5–15)
ANION GAP SERPL CALC-SCNC: 8 MMOL/L (ref 5–15)
BASOPHILS # BLD: 0 K/UL (ref 0–0.1)
BASOPHILS # BLD: 0 K/UL (ref 0–0.1)
BASOPHILS NFR BLD: 0 % (ref 0–1)
BASOPHILS NFR BLD: 0 % (ref 0–1)
BUN SERPL-MCNC: 10 MG/DL (ref 6–20)
BUN SERPL-MCNC: 10 MG/DL (ref 6–20)
BUN/CREAT SERPL: 12 (ref 12–20)
BUN/CREAT SERPL: 12 (ref 12–20)
CALCIUM SERPL-MCNC: 9.1 MG/DL (ref 8.5–10.1)
CALCIUM SERPL-MCNC: 9.1 MG/DL (ref 8.5–10.1)
CHLORIDE SERPL-SCNC: 107 MMOL/L (ref 97–108)
CHLORIDE SERPL-SCNC: 107 MMOL/L (ref 97–108)
CO2 SERPL-SCNC: 22 MMOL/L (ref 21–32)
CO2 SERPL-SCNC: 22 MMOL/L (ref 21–32)
CREAT SERPL-MCNC: 0.84 MG/DL (ref 0.55–1.02)
CREAT SERPL-MCNC: 0.84 MG/DL (ref 0.55–1.02)
DIFFERENTIAL METHOD BLD: ABNORMAL
DIFFERENTIAL METHOD BLD: ABNORMAL
EOSINOPHIL # BLD: 0 K/UL (ref 0–0.4)
EOSINOPHIL # BLD: 0 K/UL (ref 0–0.4)
EOSINOPHIL NFR BLD: 1 % (ref 0–7)
EOSINOPHIL NFR BLD: 1 % (ref 0–7)
ERYTHROCYTE [DISTWIDTH] IN BLOOD BY AUTOMATED COUNT: 28.4 % (ref 11.5–14.5)
ERYTHROCYTE [DISTWIDTH] IN BLOOD BY AUTOMATED COUNT: 28.4 % (ref 11.5–14.5)
GLUCOSE SERPL-MCNC: 103 MG/DL (ref 65–100)
GLUCOSE SERPL-MCNC: 103 MG/DL (ref 65–100)
HCT VFR BLD AUTO: 30.7 % (ref 35–47)
HCT VFR BLD AUTO: 30.7 % (ref 35–47)
HGB BLD-MCNC: 8.5 G/DL (ref 11.5–16)
HGB BLD-MCNC: 8.5 G/DL (ref 11.5–16)
IMM GRANULOCYTES # BLD AUTO: 0 K/UL (ref 0–0.04)
IMM GRANULOCYTES # BLD AUTO: 0 K/UL (ref 0–0.04)
IMM GRANULOCYTES NFR BLD AUTO: 1 % (ref 0–0.5)
IMM GRANULOCYTES NFR BLD AUTO: 1 % (ref 0–0.5)
LYMPHOCYTES # BLD: 0.8 K/UL (ref 0.8–3.5)
LYMPHOCYTES # BLD: 0.8 K/UL (ref 0.8–3.5)
LYMPHOCYTES NFR BLD: 19 % (ref 12–49)
LYMPHOCYTES NFR BLD: 19 % (ref 12–49)
MCH RBC QN AUTO: 20 PG (ref 26–34)
MCH RBC QN AUTO: 20 PG (ref 26–34)
MCHC RBC AUTO-ENTMCNC: 27.7 G/DL (ref 30–36.5)
MCHC RBC AUTO-ENTMCNC: 27.7 G/DL (ref 30–36.5)
MCV RBC AUTO: 72.1 FL (ref 80–99)
MCV RBC AUTO: 72.1 FL (ref 80–99)
MONOCYTES # BLD: 0.4 K/UL (ref 0–1)
MONOCYTES # BLD: 0.4 K/UL (ref 0–1)
MONOCYTES NFR BLD: 9 % (ref 5–13)
MONOCYTES NFR BLD: 9 % (ref 5–13)
NEUTS SEG # BLD: 3.1 K/UL (ref 1.8–8)
NEUTS SEG # BLD: 3.1 K/UL (ref 1.8–8)
NEUTS SEG NFR BLD: 70 % (ref 32–75)
NEUTS SEG NFR BLD: 70 % (ref 32–75)
NRBC # BLD: 0 K/UL (ref 0–0.01)
NRBC # BLD: 0 K/UL (ref 0–0.01)
NRBC BLD-RTO: 0 PER 100 WBC
NRBC BLD-RTO: 0 PER 100 WBC
PLATELET # BLD AUTO: 193 K/UL (ref 150–400)
PLATELET # BLD AUTO: 193 K/UL (ref 150–400)
POTASSIUM SERPL-SCNC: 3.6 MMOL/L (ref 3.5–5.1)
POTASSIUM SERPL-SCNC: 3.6 MMOL/L (ref 3.5–5.1)
RBC # BLD AUTO: 4.26 M/UL (ref 3.8–5.2)
RBC # BLD AUTO: 4.26 M/UL (ref 3.8–5.2)
RBC MORPH BLD: ABNORMAL
SODIUM SERPL-SCNC: 137 MMOL/L (ref 136–145)
SODIUM SERPL-SCNC: 137 MMOL/L (ref 136–145)
WBC # BLD AUTO: 4.3 K/UL (ref 3.6–11)
WBC # BLD AUTO: 4.3 K/UL (ref 3.6–11)

## 2024-08-30 PROCEDURE — 6370000000 HC RX 637 (ALT 250 FOR IP): Performed by: NURSE PRACTITIONER

## 2024-08-30 PROCEDURE — 2580000003 HC RX 258: Performed by: NURSE PRACTITIONER

## 2024-08-30 PROCEDURE — 36415 COLL VENOUS BLD VENIPUNCTURE: CPT

## 2024-08-30 PROCEDURE — 6370000000 HC RX 637 (ALT 250 FOR IP): Performed by: STUDENT IN AN ORGANIZED HEALTH CARE EDUCATION/TRAINING PROGRAM

## 2024-08-30 PROCEDURE — 6360000002 HC RX W HCPCS: Performed by: NURSE PRACTITIONER

## 2024-08-30 PROCEDURE — 85025 COMPLETE CBC W/AUTO DIFF WBC: CPT

## 2024-08-30 PROCEDURE — 85305 CLOT INHIBIT PROT S TOTAL: CPT

## 2024-08-30 PROCEDURE — 2580000003 HC RX 258: Performed by: INTERNAL MEDICINE

## 2024-08-30 PROCEDURE — 6360000002 HC RX W HCPCS: Performed by: INTERNAL MEDICINE

## 2024-08-30 PROCEDURE — 85306 CLOT INHIBIT PROT S FREE: CPT

## 2024-08-30 PROCEDURE — 92507 TX SP LANG VOICE COMM INDIV: CPT | Performed by: SPEECH-LANGUAGE PATHOLOGIST

## 2024-08-30 PROCEDURE — 81240 F2 GENE: CPT

## 2024-08-30 PROCEDURE — 80048 BASIC METABOLIC PNL TOTAL CA: CPT

## 2024-08-30 PROCEDURE — 99223 1ST HOSP IP/OBS HIGH 75: CPT | Performed by: INTERNAL MEDICINE

## 2024-08-30 PROCEDURE — 81241 F5 GENE: CPT

## 2024-08-30 RX ORDER — METOPROLOL SUCCINATE 25 MG/1
25 TABLET, EXTENDED RELEASE ORAL DAILY
Qty: 90 TABLET | Refills: 3 | Status: SHIPPED | OUTPATIENT
Start: 2024-08-31

## 2024-08-30 RX ORDER — ATORVASTATIN CALCIUM 80 MG/1
80 TABLET, FILM COATED ORAL NIGHTLY
Qty: 90 TABLET | Refills: 3 | Status: SHIPPED | OUTPATIENT
Start: 2024-08-30

## 2024-08-30 RX ORDER — ASPIRIN 81 MG/1
81 TABLET, CHEWABLE ORAL DAILY
Qty: 90 TABLET | Refills: 3 | Status: SHIPPED | OUTPATIENT
Start: 2024-08-31

## 2024-08-30 RX ADMIN — PANTOPRAZOLE SODIUM 40 MG: 40 INJECTION, POWDER, FOR SOLUTION INTRAVENOUS at 15:50

## 2024-08-30 RX ADMIN — CEFTRIAXONE SODIUM 1000 MG: 1 INJECTION, POWDER, FOR SOLUTION INTRAMUSCULAR; INTRAVENOUS at 10:07

## 2024-08-30 RX ADMIN — ASPIRIN 81 MG: 81 TABLET, CHEWABLE ORAL at 10:02

## 2024-08-30 RX ADMIN — METOPROLOL SUCCINATE 25 MG: 25 TABLET, EXTENDED RELEASE ORAL at 10:02

## 2024-08-30 RX ADMIN — PANTOPRAZOLE SODIUM 40 MG: 40 INJECTION, POWDER, FOR SOLUTION INTRAVENOUS at 04:25

## 2024-08-30 ASSESSMENT — PAIN SCALES - GENERAL
PAINLEVEL_OUTOF10: 0
PAINLEVEL_OUTOF10: 0

## 2024-08-30 NOTE — PROGRESS NOTES
Patient would like a NEW PATIENT PCP appt with [facility]. [facility] can't make appts on behalf of the patient at this PCP office. Patient must call themselves. Pending patient discharge.

## 2024-08-30 NOTE — PROGRESS NOTES
Gynecology Progress Note    Isabella Jeanessence    Assesment: Prolapsing submucosal fibroid    Plan: Advance diet. Surgery should be delayed given Neuro's opining that more time is needed for resolution of patient's MCA stroke.This case was discussed with Dr Churchill and C and it is their opinion that GYN ONC is better equiped to manage this.This case has been discussed with Dr Mcpherson/ Gynonc and who's  was given husbands phone number for setting up an appt with in the 1 st week after discharge to continue preop work up and plan surgery when cleared by Neurology baring severe bleeding forcing earlier intervention. OBGYN Hospitalists will sign off this case but remains available for questions/concerns.    She is without significant complaints.No vaginal bleeding.        Vitals:  /74   Pulse 91   Temp 98.1 °F (36.7 °C) (Oral)   Resp 19   Ht 1.651 m (5' 5\")   Wt 56.7 kg (125 lb)   SpO2 99%   BMI 20.80 kg/m²   Temp (24hrs), Av.6 °F (37 °C), Min:98.1 °F (36.7 °C), Max:99.1 °F (37.3 °C)      Last 24hr Input/Output:  No intake or output data in the 24 hours ending 24 1012       Exam:  General: alert, appears stated age, and cooperative     Heart:      Abdomen: abdomen is soft without significant tenderness, masses, organomegaly or guarding     Extremities: extremities normal, atraumatic, no cyanosis or edema      Labs:   Lab Results   Component Value Date/Time    WBC 4.3 2024 04:32 AM    WBC 4.1 2024 11:30 AM    WBC 4.8 2024 01:59 AM    WBC 5.0 2024 03:13 AM    WBC 4.9 2024 04:18 AM    WBC 5.5 2024 10:46 PM    WBC 5.3 2024 08:59 PM    WBC 5.4 2024 08:19 PM    HGB 8.5 2024 04:32 AM    HGB 7.7 2024 11:30 AM    HGB 8.4 2024 01:59 AM    HGB 8.5 2024 01:28 PM    HGB 6.9 2024 03:13 AM    HGB 5.6 2024 04:18 AM    HGB 5.6 2024 10:46 PM    HGB 5.3 2024 08:59 PM    HGB 5.9 2024 08:19 PM    HCT

## 2024-08-30 NOTE — DISCHARGE SUMMARY
Hospitalist Discharge Note    NAME:   Isabella Gomez   : 1975   MRN: [id number]     Admit date: 2024    Discharge date: 24    PCP: None, None    Discharge Diagnoses:    Moderate-sized acute cortical infarction left frontotemporal POA s/p tPA    Punctate foci of infarction at the right and left parietal lobes and right frontal lobe POA    Acute onset expressive aphasia POA    Iron deficiency anemia POA HgB 5.3 to 5.9, MCV 64,  Ferritin 2, Iron 11, TIBC 365    Acute on chronic blood loss anemia POA admit HgB 5.3 to 5.9,  s/p 2 units pRBCS    Menorrhagia for years POA    Protruding uterine fibroids in the vaginal canal POA    Elevated troponin 5703 --> 2239 POA likley type 2 MI with anemia    Protein S deficiency POA ? Significance to current events    Hypokalemia    Code Status: full code     Discharge Medications:  Current Discharge Medication List        START taking these medications    Details   aspirin 81 MG chewable tablet Take 1 tablet by mouth daily  Qty: 90 tablet, Refills: 3      atorvastatin (LIPITOR) 80 MG tablet Take 1 tablet by mouth nightly  Qty: 90 tablet, Refills: 3      metoprolol succinate (TOPROL XL) 25 MG extended release tablet Take 1 tablet by mouth daily  Qty: 90 tablet, Refills: 3             Follow-up Information       Follow up With Specialties Details Why Contact Info    [facility]  Follow up As needed - [facility] is a mobile urgent care provider that comes to your home. You may call them if you would like to set up an appt to be seen for a follow up while waiting to be seen by your PCP. [address]  [address]  [facility] [address] [phone]    Medhat Mcpherson MD Gynecological Oncology Follow up Keep scheduled appointment for next week [address]  [address]  [facility] [address] [phone]      Tio Casper MD Hematology and Oncology, Hematology, Oncology Follow up today office will set up an follow appointment for several  (!) 145/81 99.1 °F (37.3 °C) Oral 91 18 100 %       No intake or output data in the 24 hours ending 08/30/24 1849       I had a face to face encounter and independently examined this patient on 8/30/2024, as outlined below:  PHYSICAL EXAM:  General: Pleasant cooperative Alert, cooperative, social   EENT:   Anicteric sclerae.  Resp:  CTA bilaterally, no wheezing or rales.  No accessory muscle use  CV:  Regular  rhythm,  No edema  GI:  Soft, Non distended, Non tender.  +Bowel sounds  Neurologic:  Alert and oriented X 3, normal speech, some word finding difficulty NOTED, speech fluent   Psych:   Not anxious nor agitated  Skin:  No rashes.  No jaundice    Reviewed most current lab test results and cultures  YES  Reviewed most current radiology test results   YES  Review and summation of old records today    NO  Reviewed patient's current orders and MAR    YES  PMH/SH reviewed - no change compared to H&P    Procedures: see electronic medical records for all procedures/Xrays and details which were not copied into this note but were reviewed prior to creation of Plan.      LABS:  I reviewed today's most current labs and imaging studies.  Pertinent labs include:  Recent Labs     08/29/24  1130 08/30/24  0432   WBC 4.1 4.3   HGB 7.7* 8.5*   HCT 27.7* 30.7*    193     Recent Labs     08/29/24  1130 08/30/24  0432    137   K 3.2* 3.6   * 107   CO2 21 22   GLUCOSE 132* 103*   BUN 11 10   CREATININE 0.66 0.84   CALCIUM 7.9* 9.1   MG 1.8  --        Signed: Alexander Ma Jr, MD

## 2024-08-30 NOTE — CARE COORDINATION
Home with outpatient SLP.    to transport.   Clear from CM.     Transition of Care Plan:     RUR: 10% (low RUR)   Prior Level of Functioning: Independent  Disposition: Home with outpatient SLP  If SNF or IPR: Date FOC offered:   Date FOC received:   Accepting facility:   Date authorization started with reference number:   Date authorization received and expires:   Follow up appointments: PCP/Specialists as indicated  DME needed: None at this time  Transportation at discharge: Family to transport  IM/IMM Medicare/ letter given: N/a - Commercial   Is patient a [place] and connected with VA? N/a              If yes, was  transfer form completed and VA notified? N/a  Caregiver Contact: Spouse:Jorge Gomez: [phone]   Discharge Caregiver contacted prior to discharge? Pt to contact  Care Conference needed? Not at this time  Barriers to discharge: Medical clearance    0904 - Chart reviewed. No CM needs identified. Plan remains home with family to transport.    1102 - SMAARTER tool completed and on door frame. Pt is clear from CM for d/c.        08/30/24 1103   Services At/After Discharge   Transition of Care Consult (CM Consult) Discharge Planning   Services At/After Discharge SLP;Outpatient    Resource Information Provided? No  (N/a)   Mode of Transport at Discharge Other (see comment)  ( to transport)   Confirm Follow Up Transport Family   Condition of Participation: Discharge Planning   The Plan for Transition of Care is related to the following treatment goals: Home with follow-ups to continue care goals         ALAYNA Weaver  Care Management  [facility]  [phone]

## 2024-08-30 NOTE — PLAN OF CARE
Problem: Discharge Planning  Goal: Discharge to home or other facility with appropriate resources  8/30/2024 1155 by Nany Hoskins RN  Outcome: Progressing  8/29/2024 2203 by Sohail Bauman RN  Outcome: Progressing     Problem: Safety - Adult  Goal: Free from fall injury  8/30/2024 1155 by Nany Hoskins RN  Outcome: Progressing  8/29/2024 2203 by Sohail Bauman RN  Outcome: Progressing     Problem: Skin/Tissue Integrity  Goal: Absence of new skin breakdown  Description: 1.  Monitor for areas of redness and/or skin breakdown  2.  Assess vascular access sites hourly  3.  Every 4-6 hours minimum:  Change oxygen saturation probe site  4.  Every 4-6 hours:  If on nasal continuous positive airway pressure, respiratory therapy assess nares and determine need for appliance change or resting period.  8/30/2024 1155 by Nany Hoskins RN  Outcome: Progressing  8/29/2024 2203 by Sohail Bauman RN  Outcome: Progressing     Problem: Pain  Goal: Verbalizes/displays adequate comfort level or baseline comfort level  8/30/2024 1155 by Nany Hoskins RN  Outcome: Progressing  8/29/2024 2203 by Sohail Bauman RN  Outcome: Progressing     Problem: Neurosensory - Adult  Goal: Achieves stable or improved neurological status  8/30/2024 1155 by Nany Hoskins RN  Outcome: Progressing  8/29/2024 2203 by Sohail Bauman RN  Outcome: Progressing  Goal: Achieves maximal functionality and self care  8/30/2024 1155 by Nany Hoskins RN  Outcome: Progressing  8/29/2024 2203 by Sohail Bauman RN  Outcome: Progressing     Problem: Musculoskeletal - Adult  Goal: Return mobility to safest level of function  8/30/2024 1155 by Nany Hoskins RN  Outcome: Progressing  8/29/2024 2203 by Sohail Bauman RN  Outcome: Progressing  Goal: Return ADL status to a safe level of function  8/30/2024 1155 by Nany Hoskins RN  Outcome: Progressing  8/29/2024 2203 by Sohail Bauman RN  Outcome: Progressing     Problem: Metabolic/Fluid and  Electrolytes - Adult  Goal: Electrolytes maintained within normal limits  8/30/2024 1155 by Nany Hoskins, RN  Outcome: Progressing  8/29/2024 2203 by Sohail Bauman, RN  Outcome: Progressing

## 2024-08-30 NOTE — DISCHARGE INSTR - MEDS
Take aspirin 81 mg daily for stroke prevention    Lipitor or atorvastatin 80 mg at bedtime is a medicine to reduce your bad cholesterol, also prevent strokes.    Metoprolol is a medicine to control your blood pressure and heart rate    Keep follow-up appointments as listed

## 2024-08-30 NOTE — PROGRESS NOTES
Hospitalist Progress Note    NAME:   Isabella Gomez   : 1975   MRN: [id number]     Date/Time: 2024 11:03 PM  Patient PCP: None, None    Estimated discharge date:     Barriers: Cardas and GYN clearance    ICU transfer:   Linnea Gomez is a 47 yo female with a PMH of PAOLO (not on supplementation) who presented to the ED with acute onset expressive aphasia that began 45 min prior to ED arrival.    She was given TNK in the ED.   CT head and neck showed L M3 segment occlusion with correlating perfusion defect.   Transferred out ICU 2024     Assessment / Plan:    Moderate-sized acute cortical infarction left frontotemporal POA   Punctate foci of infarction at the right and left parietal lobes and right frontal lobe POA  Acute onset expressive aphasia POA  Presented with acute onset expressive aphasia  Admit head CT IMPRESSION:  No acute intracranial process.  There is no intracranial mass or hemorrhage.  CTA head and neck impression:   No carotid stenosis   No intracranial atherosclerosis  CT Perfusion:  area of delayed perfusion in the left posterior temporal/occipital  lobe, correlating with the left M3 segment occlusion  IMPRESSION:      Left M3 segment occlusion, with correlating perfusion defect.  MRI 2024 IMPRESSION:  Moderate-sized acute cortical infarction left frontotemporal   punctate foci of infarction at the right and left parietal lobes and right frontal lobe as well.   No superimposed hemorrhage, midline shift or mass effect..  Admit TTE, LVEF 50-55%, normal left atrial size, normal mitral and aortic valves  Repeat echo with agitated saline  Interatrial Septum: Agitated saline study was negative with and without provocation.   Seen by Tele neuro in ED  TNK given in ED on evening 2024   Head CT at 24 hours with no evidence of hemorrhage  Transferred to floor 2024  Neurology following   ASA   No plavix recommended  HTN BP goal < 140's systolic   LDL 34  HgBa1c  cleared the patient for cardiac surgery when stable    Protein S deficiency  ? Significance, not clearly associated with strokes  Protein S activity ~ 34%  Protein S profile pending  Will ask hematology to comment  LE dopplers negative for DVT    Hypokalemia  Resolved    Code Status: full code   DVT Prophylaxis: Aspirin  GI Prophylaxis: protonix     Medical Decision Making:   I personally reviewed labs:bmp, cbc   I personally reviewed imaging:  I personally reviewed EKG:  Toxic drug monitoring: Monitor for bleeding while on baby aspirin  Discussed case with: CM/NS/PT on IDR rounds    Daily patient family at bedside      Subjective:     Chief Complaint / Reason for Physician Visit  \"Follow-up CVA status post TNK  \"I am okay\"  No new complaints  Speech improved, still having someone for any difficulties  Overall family says much improved from admission  Stress test was negative today  Lower extremity Doppler with no DVT    Objective:     VITALS:   Last 24hrs VS reviewed since prior progress note. Most recent are:  Patient Vitals for the past 24 hrs:   BP Temp Temp src Pulse Resp SpO2   08/29/24 2008 (!) 145/81 99.1 °F (37.3 °C) Oral 91 18 100 %   08/29/24 1055 132/76 98.6 °F (37 °C) Oral 97 18 100 %   08/29/24 0800 122/64 98.6 °F (37 °C) Oral 91 18 100 %       No intake or output data in the 24 hours ending 08/29/24 2303       I had a face to face encounter and independently examined this patient on 8/29/2024, as outlined below:  PHYSICAL EXAM:  General: Pleasant cooperative Alert, cooperative, social   EENT:  EOMI. Anicteric sclerae.  Resp:  CTA bilaterally, no wheezing or rales.  No accessory muscle use  CV:  Regular  rhythm,  No edema  GI:  Soft, Non distended, Non tender.  +Bowel sounds  Neurologic:  Alert and oriented X 3, normal speech, some word finding difficulty NOTED, speech fluent   Psych:   Good insight. Not anxious nor agitated  Skin:  No rashes.  No jaundice    Reviewed most current lab test results and

## 2024-08-30 NOTE — PLAN OF CARE
Speech LAnguage Pathology TREATMENT    Patient: Isabella Gomez (48 y.o. female)  Date: 8/30/2024  Primary Diagnosis: Acute CVA (cerebrovascular accident) (HCC) [I63.9]  Procedure(s) (LRB):  MYOMECTOMY, POSSIBLE ABDOMINAL HYSTERECTOMY (N/A) 2 Days Post-Op   Precautions:                     ASSESSMENT :  Patient demonstrates moderate expressive language deficits characterized by word retrieval deficits.  Patient demonstrated good ability to repeat at word level this date.  Patient able to use word retrieval strategies (gesture, describing) with good results.  Note groping for words, phonemic errors that increases with length of utterance and multiple attempts at word.  Apraxia of speech may be impacting verbal expression as well however difficult to fully assess given aphasia.  Again, written cues aide in communication.  Patient continues to demonstrate difficulty expressing basic wants and needs however patient is motivated to work and demonstrates progress.    Patient will benefit from skilled intervention to address the above impairments.     PLAN :  Recommendations and Planned Interventions:  Word retrieval strategies: point, gesture, describe, use another word     Acute SLP Services: Yes, SLP will continue to follow per plan of care.    Discharge Recommendations: Yes, recommend SLP treatment at next level of care - Discussed with      SUBJECTIVE:   Patient stated, “Yes, better.”  Patient's sister and  at bedside.    OBJECTIVE:   History reviewed. No pertinent past medical history.No past surgical history on file.  Prior Level of Function/Home Situation:   Social/Functional History  Lives With: Spouse, Daughter, Son  Type of Home: House  Home Layout: Two level  Home Access: Stairs to enter with rails  Entrance Stairs - Number of Steps: 3  Entrance Stairs - Rails: Both  Bathroom Shower/Tub: Tub/Shower unit  Bathroom Equipment: None  Home Equipment: None  Receives Help From: Family  Active

## 2024-08-30 NOTE — PROGRESS NOTES
.End of Shift Note    Bedside shift change report given to  ELVER Marinelli  (oncoming nurse) by ELVER Sauceda.        Shift worked: 7PM-7AM   Shift summary and any significant changes:    No acute changes  For potential fibroid removal     Concerns for physician to address: None   Zone phone for oncoming shift:       Patient Information  Isabella Gomez  48 y.o.  8/24/2024  8:11 PM by Laine Michel MD. Isabella Gomez was admitted from [facility]    Problem List  Patient Active Problem List    Diagnosis Date Noted    Acute CVA (cerebrovascular accident) (HCC) 08/24/2024     History reviewed. No pertinent past medical history.    Core Measures:  CVA: yes  CHF: no  PNA: no    Activity:  Level of Assistance: Minimal assist, patient does 75% or more  Number times ambulated in hallways past shift: 1  Number of times OOB to chair past shift: 0    Cardiac:   Cardiac Monitoring: yes,     Access:   Current line(s): PIV    Respiratory:   O2 Device: None (Room air)    GI:  Last BM (including prior to admit): 08/29/24  Current diet:  ADULT DIET; Regular  Tolerating current diet: Yes    Pain Management:   Patient states pain is manageable on current regimen: yes    Skin:  Ancelmo Scale Score: 22  Interventions: N/A  Pressure injury: no    Patient Safety:  Fall Score: Rosales Total Score: 35  Interventions: bed alarm  Self-release roll belt: No  Dexterity to release roll belt: no   (must document dexterity  here by stating Yes or No here, otherwise this is a restraint and must follow restraint documentation policy.)    DVT prophylaxis:  DVT prophylaxis: meds    Active Consults:  IP CONSULT TO TELE-NEUROLOGY  IP CONSULT TO NEUROLOGY  IP CONSULT TO NEUROLOGY  IP CONSULT TO OB GYN  IP CONSULT TO CARDIOLOGY  IP CONSULT TO CARDIOLOGY    Length of Stay:  Expected LOS: 7  Actual LOS: 6    ELVER Ayon

## 2024-08-30 NOTE — CONSULTS
[facility] [place] [facility]  [address] [facility] [address] [address]  W: [phone] F: [phone]  Hematology/ Oncology Consult Note      Reason for consult:     Isabella Gomez is a 48 y.o. female who we have been asked to see by Dr. Ma for protein S deficiency.    Subjective:     Isabella Gomez is a 48-year-old female patient admitted with acute CVA.  She has a past medical history of iron deficiency anemia related to protruding uterine fibroids.  She has no other pertinent past medical history.  She has no past medical history of DVT or PE.  No family history of PE/DVT.    Patient seen at the bedside with her  and daughter.  Discussed concern for protein S deficiency and further lab work that is pending for hypercoagulable workup.  Discussed much of these results will not return before patient is discharged and will arrange close outpatient follow-up.  Patient and her  verbalized understanding and agreement.      Review of Systems:  Pertinent items are noted in the History of Present Illness.       History reviewed. No pertinent past medical history.  No past surgical history on file.   No family history on file.  Social History     Tobacco Use    Smoking status: Never    Smokeless tobacco: Never   Substance Use Topics    Alcohol use: Not Currently      Current Facility-Administered Medications   Medication Dose Route Frequency Provider Last Rate Last Admin    metoprolol succinate (TOPROL XL) extended release tablet 25 mg  25 mg Oral Daily Vic Marc MD   25 mg at 08/30/24 1002    pantoprazole (PROTONIX) 40 mg in sodium chloride (PF) 0.9 % 10 mL injection  40 mg IntraVENous Q12H Austin Davison APRN - NP   40 mg at 08/30/24 0425    calcium carbonate (TUMS) chewable tablet 500 mg  500 mg Oral TID PRN Austin Davison APRN - NP   500 mg at 08/26/24 0304    0.9 % sodium chloride infusion    IntraVENous PRN Austin Davison APRN - NP        lidocaine 4 % external patch 2 patch  2 patch TransDERmal Daily Austin Davison APRN - NP   2 patch at 08/26/24 1122    dextrose bolus 10% 125 mL  125 mL IntraVENous PRN Vernon Stone MD        sodium chloride flush 0.9 % injection 5-40 mL  5-40 mL IntraVENous 2 times per day Aleksandra Somers APRN - NP   10 mL at 08/29/24 2051    sodium chloride flush 0.9 % injection 5-40 mL  5-40 mL IntraVENous PRN Aleksandra Somers APRN - NP        0.9 % sodium chloride infusion   IntraVENous PRN Aleksandra Somers APRN -  mL/hr at 08/27/24 1028 New Bag at 08/27/24 1028    potassium chloride 20 mEq/50 mL IVPB (Central Line)  20 mEq IntraVENous PRN Aleksandra Somers APRN - NP        Or    potassium chloride 10 mEq/100 mL IVPB (Peripheral Line)  10 mEq IntraVENous PRN Aleksandra Somers APRN - NP   Stopped at 08/26/24 0504    magnesium sulfate 2000 mg in 50 mL IVPB premix  2,000 mg IntraVENous PRN Aleksandra Somers APRN - NP        acetaminophen (TYLENOL) tablet 650 mg  650 mg Oral Q6H PRN Aleksandra Somers APRN - NP   650 mg at 08/27/24 0152    Or    acetaminophen (TYLENOL) suppository 650 mg  650 mg Rectal Q6H PRN Aleksandra Somers APRN - NP        ondansetron (ZOFRAN-ODT) disintegrating tablet 4 mg  4 mg Oral Q8H PRN Aleksandra Somers APRN - NP        Or    ondansetron (ZOFRAN) injection 4 mg  4 mg IntraVENous Q6H PRN Aleksandra Somers APRN - NP   4 mg at 08/26/24 0520    polyethylene glycol (GLYCOLAX) packet 17 g  17 g Oral Daily PRN Aleksandra Somers APRN - NP        aspirin chewable tablet 81 mg  81 mg Oral Daily Aleksandra Somers APRN - NP   81 mg at 08/30/24 1002    Or    aspirin suppository 300 mg  300 mg Rectal Daily Aleksandra Somers APRN - NP        atorvastatin (LIPITOR) tablet 80 mg  80 mg Oral Nightly Aleksandra Somers APRN - NP   80 mg at 08/29/24 2051        No Known Allergies       Objective:     Patient Vitals for the past 8 hrs:   BP

## 2024-08-30 NOTE — PLAN OF CARE
Problem: SLP Adult - Impaired Communication  Goal: By Discharge: Demonstrates communication skills at highest level of function for planned discharge setting.  See evaluation for individualized goals.  Description: Speech Pathology Goals:  Initiated 8/26/2024     1. Patient will participate in continued diagnostic speech-language diagnostic treatment within 7 days.   8/30/2024 1500 by Damari Gann SLP  Outcome: Progressing     Problem: Discharge Planning  Goal: Discharge to home or other facility with appropriate resources  8/30/2024 1843 by Nany Hoskins RN  Outcome: Completed  8/30/2024 1155 by Nany Hoskins RN  Outcome: Progressing     Problem: Safety - Adult  Goal: Free from fall injury  8/30/2024 1843 by Nany Hoskins RN  Outcome: Completed  8/30/2024 1155 by Nany Hoskins RN  Outcome: Progressing     Problem: Skin/Tissue Integrity  Goal: Absence of new skin breakdown  Description: 1.  Monitor for areas of redness and/or skin breakdown  2.  Assess vascular access sites hourly  3.  Every 4-6 hours minimum:  Change oxygen saturation probe site  4.  Every 4-6 hours:  If on nasal continuous positive airway pressure, respiratory therapy assess nares and determine need for appliance change or resting period.  8/30/2024 1843 by Nany Hoskins RN  Outcome: Completed  8/30/2024 1155 by Nany Hoskins RN  Outcome: Progressing     Problem: Pain  Goal: Verbalizes/displays adequate comfort level or baseline comfort level  8/30/2024 1843 by Nany Hoskins RN  Outcome: Completed  8/30/2024 1155 by Nany Hoskins RN  Outcome: Progressing     Problem: Neurosensory - Adult  Goal: Achieves stable or improved neurological status  8/30/2024 1843 by Nany Hoskins RN  Outcome: Completed  8/30/2024 1155 by Nany Hoskins RN  Outcome: Progressing  Goal: Achieves maximal functionality and self care  8/30/2024 1843 by Nany Hoskins RN  Outcome: Completed  8/30/2024 1155 by Nany Hoskins RN  Outcome:  Progressing     Problem: Musculoskeletal - Adult  Goal: Return mobility to safest level of function  8/30/2024 1843 by Nany Hoskins, RN  Outcome: Completed  8/30/2024 1155 by Nany Hoskins RN  Outcome: Progressing  Goal: Return ADL status to a safe level of function  8/30/2024 1843 by Nany Hoskins, RN  Outcome: Completed  8/30/2024 1155 by Nany Hoskins, RN  Outcome: Progressing     Problem: Metabolic/Fluid and Electrolytes - Adult  Goal: Electrolytes maintained within normal limits  8/30/2024 1843 by Nayn Hoskins, RN  Outcome: Completed  8/30/2024 1155 by Nany Hoskins, RN  Outcome: Progressing

## 2024-08-30 NOTE — PROGRESS NOTES
Patient and family given discharge instructions with opportunity to ask questions and all questions answered. Patient in possession of all belongings.

## 2024-08-30 NOTE — PROGRESS NOTES
Nuclear stress test shows low risk findings. OK to proceed with plan uterine surgery. Outpatient follow up for Cardiac work up.

## 2024-08-31 LAB
ABO + RH BLD: NORMAL
ABO + RH BLD: NORMAL
BLD PROD TYP BPU: NORMAL
BLOOD BANK CMNT PATIENT-IMP: NORMAL
BLOOD BANK CMNT PATIENT-IMP: NORMAL
BLOOD BANK DISPENSE STATUS: NORMAL
BLOOD GROUP ANTIBODIES SERPL: NORMAL
BLOOD GROUP ANTIBODIES SERPL: NORMAL
BPU ID: NORMAL
CROSSMATCH RESULT: NORMAL
DAT C3B/C3D, C3D: NORMAL
DAT C3B/C3D, C3D: NORMAL
DAT IGG-SP REAG RBC QL: NORMAL
DAT IGG-SP REAG RBC QL: NORMAL
DAT POLY-SP REAG RBC QL: NORMAL
DAT POLY-SP REAG RBC QL: NORMAL
SPECIMEN EXP DATE BLD: NORMAL
SPECIMEN EXP DATE BLD: NORMAL
UNIT DIVISION: 0

## 2024-09-01 LAB
PROT S ACT/NOR PPP: 33 % (ref 63–140)
PROT S ACT/NOR PPP: 33 % (ref 63–140)
PROT S AG ACT/NOR PPP IA: 72 % (ref 60–150)
PROT S AG ACT/NOR PPP IA: 72 % (ref 60–150)
PROT S FREE AG ACT/NOR PPP IA: 74 % (ref 61–136)
PROT S FREE AG ACT/NOR PPP IA: 74 % (ref 61–136)

## 2024-09-02 PROBLEM — D50.0 IRON DEFICIENCY ANEMIA SECONDARY TO BLOOD LOSS (CHRONIC): Status: ACTIVE | Noted: 2024-09-02

## 2024-09-03 ENCOUNTER — OFFICE VISIT (OUTPATIENT)
Age: 49
End: 2024-09-03
Payer: COMMERCIAL

## 2024-09-03 ENCOUNTER — TELEPHONE (OUTPATIENT)
Age: 49
End: 2024-09-03

## 2024-09-03 VITALS
WEIGHT: 120.6 LBS | DIASTOLIC BLOOD PRESSURE: 79 MMHG | SYSTOLIC BLOOD PRESSURE: 148 MMHG | BODY MASS INDEX: 20.09 KG/M2 | HEART RATE: 84 BPM | HEIGHT: 65 IN

## 2024-09-03 DIAGNOSIS — D50.0 IRON DEFICIENCY ANEMIA SECONDARY TO BLOOD LOSS (CHRONIC): ICD-10-CM

## 2024-09-03 DIAGNOSIS — I63.9 CEREBROVASCULAR ACCIDENT (CVA), UNSPECIFIED MECHANISM (HCC): ICD-10-CM

## 2024-09-03 DIAGNOSIS — D21.9 FIBROID: Primary | ICD-10-CM

## 2024-09-03 LAB
F5 P.R506Q BLD/T QL: NORMAL
IMP & REVIEW OF LAB RESULTS: NORMAL

## 2024-09-03 PROCEDURE — 99205 OFFICE O/P NEW HI 60 MIN: CPT | Performed by: OBSTETRICS & GYNECOLOGY

## 2024-09-03 NOTE — TELEPHONE ENCOUNTER
Patients  Girma would like a call back regding a GYN decision that the patient would have to make within 2 days that would involve neurological issue. Please call Girma at [phone]

## 2024-09-03 NOTE — PROGRESS NOTES
New Patient, Referred by [facility] ER, prolapsing fibroid    1. Have you been to the ER, urgent care clinic since your last visit?  Hospitalized since your last visit?  To ER on 8/24/24 for stroke, was hospitalized from 8/24/24 - 8/30/24    2. Have you seen or consulted any other health care providers outside of the [facility] since your last visit?  Include any pap smears or colon screening.   no      
addressed with the patient and she is comfortable with the plan.     Defined Sensitive Document    >50% of total time allocated to visit dedicated to counseling, 60 minutes total.    Signed By: Medhat Mcpherson MD     9/3/2024/8:22 AM

## 2024-09-04 LAB
APTT HEX PL PPP: 22 SEC
APTT IMM NP PPP: ABNORMAL SEC
APTT PPP 1:1 SALINE: ABNORMAL SEC
APTT PPP: 26.7 SEC
B2 GLYCOPROT1 IGA SER-ACNC: <10 SAU
B2 GLYCOPROT1 IGG SER-ACNC: <10 SGU
B2 GLYCOPROT1 IGM SER-ACNC: <10 SMU
CARDIOLIPIN IGA SER IA-ACNC: <10 APL
CARDIOLIPIN IGG SER IA-ACNC: <10 GPL
CARDIOLIPIN IGM SER IA-ACNC: 65 MPL
CONFIRM APTT: 1.2 SEC
CONFIRM DRVVT: 35.1 SEC
LABORATORY COMMENT REPORT: ABNORMAL
PROTHROM IGG SERPL-ACNC: 7 G UNITS
PS IGG SER IA-ACNC: 0 GPS
PS IGM SER IA-ACNC: 10 MPS
SCREEN DRVVT/NORMAL: 1.4 RATIO
SCREEN DRVVT: 56 SEC

## 2024-09-04 NOTE — TELEPHONE ENCOUNTER
Spoke with pt. , Tj.  Verified patient with two patient identifiers.    Saw Hans Ruiz NP on 8-29-24.    States Dr. ARIAN Mcpherson wants to do hysterectomy on pt.  States neurology advised waiting for 3 months since pt is s/p \"small stroke\".  Surgeon states they can wait 3 months and do nothing.  Or, they can do a procedure to slow the blood supply to her uterus by putting coils in the arteries that supply blood to the uterus.    Pt and  would like our opinion what to do.  They did state they are okay if they do nothing.    Pls advise which is best in your opinion.

## 2024-09-05 LAB
F2 GENE MUT ANL BLD/T: NORMAL
IMP & REVIEW OF LAB RESULTS: NORMAL

## 2024-09-05 NOTE — TELEPHONE ENCOUNTER
Have already spoken with pt, just verified with Hans.    Hans Ruiz, SHAVON - NP  You10 minutes ago (4:09 PM)       Continue wit aspirin daily    ThanksSara

## 2024-09-05 NOTE — TELEPHONE ENCOUNTER
Spoke with pt, patient's son, Girma.  Verified patient with two patient identifiers.    Discussed surgical note in depth as noted below.  Also advised to continue ASA 81 mg qd unless a physician advised otherwise.  They wanted to make sure Plavix was not needed also..  Advised our last note mentioned continue ASA 81 mg qd only.  Will verify with Hans Ruiz NP.        Hans Ruiz APRN - NP sent to Adela Bourgeois LPN  Caller: Unspecified (2 days ago, 11:01 AM)    Ms. Chapman,  It was discussed to patient and family at the hospital that it would be ideal for patient to wait at least 3 months after a stroke before performing noncardiac/non-life-threatening surgery that may be done electively given the risk for potentially worsening outcome from a stroke standpoint . Overall given her complicated case, it was recommended to wait at least two weeks prior to any elective, non-lifethreatening, noncardiac surgery.    Thank you,  Sara

## 2024-09-09 LAB
F5 P.R506Q BLD/T QL: NORMAL
IMP & REVIEW OF LAB RESULTS: NORMAL

## 2024-09-12 ENCOUNTER — TELEPHONE (OUTPATIENT)
Age: 49
End: 2024-09-12

## 2024-09-12 DIAGNOSIS — Z86.73 HISTORY OF STROKE: Primary | ICD-10-CM

## 2024-09-17 ENCOUNTER — HOSPITAL ENCOUNTER (OUTPATIENT)
Facility: HOSPITAL | Age: 49
Discharge: HOME | End: 2024-09-20
Attending: OBSTETRICS & GYNECOLOGY
Payer: COMMERCIAL

## 2024-09-17 DIAGNOSIS — D21.9 FIBROID: ICD-10-CM

## 2024-09-17 PROCEDURE — 72197 MRI PELVIS W/O & W/DYE: CPT

## 2024-09-17 PROCEDURE — A9579 GAD-BASE MR CONTRAST NOS,1ML: HCPCS | Performed by: OBSTETRICS & GYNECOLOGY

## 2024-09-17 PROCEDURE — 6360000004 HC RX CONTRAST MEDICATION: Performed by: OBSTETRICS & GYNECOLOGY

## 2024-09-17 RX ADMIN — GADOTERIDOL 11 ML: 279.3 INJECTION, SOLUTION INTRAVENOUS at 11:27

## 2024-09-23 ENCOUNTER — CLINICAL DOCUMENTATION (OUTPATIENT)
Age: 49
End: 2024-09-23

## 2024-09-24 ENCOUNTER — TELEMEDICINE (OUTPATIENT)
Age: 49
End: 2024-09-24
Payer: COMMERCIAL

## 2024-09-24 DIAGNOSIS — D25.0 SUBMUCOUS LEIOMYOMA OF UTERUS: Primary | ICD-10-CM

## 2024-09-24 DIAGNOSIS — D50.0 IRON DEFICIENCY ANEMIA SECONDARY TO BLOOD LOSS (CHRONIC): ICD-10-CM

## 2024-09-24 DIAGNOSIS — I63.9 CEREBROVASCULAR ACCIDENT (CVA), UNSPECIFIED MECHANISM (HCC): ICD-10-CM

## 2024-09-24 PROCEDURE — 99213 OFFICE O/P EST LOW 20 MIN: CPT | Performed by: OBSTETRICS & GYNECOLOGY

## 2024-09-25 PROBLEM — D25.0 SUBMUCOUS LEIOMYOMA OF UTERUS: Status: ACTIVE | Noted: 2024-09-25

## 2024-09-27 ENCOUNTER — OFFICE VISIT (OUTPATIENT)
Age: 49
End: 2024-09-27
Payer: COMMERCIAL

## 2024-09-27 VITALS
DIASTOLIC BLOOD PRESSURE: 70 MMHG | BODY MASS INDEX: 20.36 KG/M2 | WEIGHT: 122.2 LBS | HEIGHT: 65 IN | OXYGEN SATURATION: 99 % | SYSTOLIC BLOOD PRESSURE: 139 MMHG | HEART RATE: 100 BPM

## 2024-09-27 DIAGNOSIS — I63.511 CEREBROVASCULAR ACCIDENT (CVA) DUE TO OCCLUSION OF RIGHT MIDDLE CEREBRAL ARTERY (HCC): ICD-10-CM

## 2024-09-27 DIAGNOSIS — D50.0 IRON DEFICIENCY ANEMIA DUE TO CHRONIC BLOOD LOSS: Primary | ICD-10-CM

## 2024-09-27 PROCEDURE — 99214 OFFICE O/P EST MOD 30 MIN: CPT | Performed by: INTERNAL MEDICINE

## (undated) DEVICE — SOLUTION IRRIG 3000ML 0.9% SOD CHL USP UROMATIC PLAS CONT

## (undated) DEVICE — FLUID MGMT SYS FLUENT KIT 6/PK

## (undated) DEVICE — SUTURE VICRYL SZ 2-0 L36IN ABSRB UD L36MM CT-1 1/2 CIR J945H

## (undated) DEVICE — FILTER CLP DISP FOR 5513E CLIPVAC

## (undated) DEVICE — SOLUTION IRRIG 1000ML 0.9% SOD CHL USP POUR PLAS BTL

## (undated) DEVICE — COVER LT HNDL PLAS RIG 1 PER PK

## (undated) DEVICE — YANKAUER,BULB TIP,W/O VENT,RIGID,STERILE: Brand: MEDLINE

## (undated) DEVICE — SUTURE MONOCRYL SZ 3-0 L27IN ABSRB UD L19MM PS-2 3/8 CIR PRIM Y427H

## (undated) DEVICE — SET ENDOSCP SEAL HYSTEROSCOPE RIG OUTFLO CHN DISP MYOSURE

## (undated) DEVICE — SOLUTION IRRIG 1000ML STRL H2O USP PLAS POUR BTL

## (undated) DEVICE — BLADE,CARBON-STEEL,15,STRL,DISPOSABLE,TB: Brand: MEDLINE

## (undated) DEVICE — KIT,1200CC CANISTER,3/16"X6' TUBING: Brand: MEDLINE INDUSTRIES, INC.

## (undated) DEVICE — PREMIUM WET SKIN PREP TRAY: Brand: MEDLINE INDUSTRIES, INC.

## (undated) DEVICE — GOWN,SIRUS,FABRNF,XL,20/CS: Brand: MEDLINE

## (undated) DEVICE — D&C MRMC: Brand: MEDLINE INDUSTRIES, INC.

## (undated) DEVICE — SUTURE VICRYL + SZ 0 L18IN ABSRB VLT CT L40MM 1 2 CIR TAPR PNT

## (undated) DEVICE — GLOVE SURG SZ 65 THK91MIL LTX FREE SYN POLYISOPRENE

## (undated) DEVICE — BLADE,CARBON-STEEL,10,STRL,DISPOSABLE,TB: Brand: MEDLINE

## (undated) DEVICE — SUTURE VICRYL SZ 2-0 L27IN ABSRB UD L26MM SH 1/2 CIR J417H

## (undated) DEVICE — MAJOR LAPAROTOMY-MRMC: Brand: MEDLINE INDUSTRIES, INC.

## (undated) DEVICE — PAD,SANITARY,11 IN,MAXI,N-STRL,IND WRAP: Brand: MEDLINE

## (undated) DEVICE — 1LYRTR 16FR10ML100%SIL UMS SNP: Brand: MEDLINE INDUSTRIES, INC.

## (undated) DEVICE — SUTURE PDS II SZ 0 L27IN ABSRB VLT L36MM CT-1 1/2 CIR Z340H

## (undated) DEVICE — SEALER ENDOSCP NANO COAT OPN DIV CRV L JAW LIGASURE IMPACT